# Patient Record
Sex: FEMALE | Race: WHITE | NOT HISPANIC OR LATINO | Employment: OTHER | URBAN - NONMETROPOLITAN AREA
[De-identification: names, ages, dates, MRNs, and addresses within clinical notes are randomized per-mention and may not be internally consistent; named-entity substitution may affect disease eponyms.]

---

## 2017-03-15 ENCOUNTER — OFFICE VISIT (OUTPATIENT)
Dept: CARDIOLOGY | Facility: CLINIC | Age: 47
End: 2017-03-15

## 2017-03-15 VITALS
HEIGHT: 63 IN | BODY MASS INDEX: 20.09 KG/M2 | HEART RATE: 87 BPM | WEIGHT: 113.4 LBS | DIASTOLIC BLOOD PRESSURE: 84 MMHG | SYSTOLIC BLOOD PRESSURE: 135 MMHG

## 2017-03-15 DIAGNOSIS — I10 ESSENTIAL HYPERTENSION: ICD-10-CM

## 2017-03-15 DIAGNOSIS — D68.51 FACTOR 5 LEIDEN MUTATION, HETEROZYGOUS (HCC): ICD-10-CM

## 2017-03-15 DIAGNOSIS — I25.118 CORONARY ARTERY DISEASE OF NATIVE ARTERY OF NATIVE HEART WITH STABLE ANGINA PECTORIS (HCC): ICD-10-CM

## 2017-03-15 DIAGNOSIS — R00.2 PALPITATIONS: ICD-10-CM

## 2017-03-15 DIAGNOSIS — E78.5 HYPERLIPIDEMIA LDL GOAL <70: Primary | ICD-10-CM

## 2017-03-15 PROBLEM — D68.59 HYPERCOAGULABLE STATE: Status: ACTIVE | Noted: 2017-03-15

## 2017-03-15 PROBLEM — I25.10 CORONARY ARTERY DISEASE INVOLVING NATIVE CORONARY ARTERY OF NATIVE HEART WITHOUT ANGINA PECTORIS: Status: ACTIVE | Noted: 2017-03-15

## 2017-03-15 PROBLEM — Z72.0 TOBACCO ABUSE: Status: ACTIVE | Noted: 2017-03-15

## 2017-03-15 PROCEDURE — 99214 OFFICE O/P EST MOD 30 MIN: CPT | Performed by: NURSE PRACTITIONER

## 2017-03-15 RX ORDER — LISINOPRIL 5 MG/1
5 TABLET ORAL DAILY
Qty: 90 TABLET | Refills: 3 | Status: SHIPPED | OUTPATIENT
Start: 2017-03-15 | End: 2018-03-15

## 2017-03-15 RX ORDER — FUROSEMIDE 40 MG/1
40 TABLET ORAL DAILY
COMMUNITY
End: 2020-02-21

## 2017-03-15 RX ORDER — FOLIC ACID 1 MG/1
1 TABLET ORAL DAILY
COMMUNITY
End: 2020-02-21

## 2017-03-15 RX ORDER — NITROGLYCERIN 0.4 MG/1
0.4 TABLET SUBLINGUAL
COMMUNITY
End: 2020-02-21 | Stop reason: SDUPTHER

## 2017-03-15 RX ORDER — CLOPIDOGREL BISULFATE 75 MG/1
75 TABLET ORAL DAILY
Qty: 90 TABLET | Refills: 3 | Status: SHIPPED | OUTPATIENT
Start: 2017-03-15 | End: 2018-03-15

## 2017-03-15 RX ORDER — ASPIRIN 81 MG/1
81 TABLET ORAL DAILY
COMMUNITY
End: 2020-02-21 | Stop reason: SDUPTHER

## 2017-03-15 RX ORDER — PRAVASTATIN SODIUM 20 MG
20 TABLET ORAL DAILY
COMMUNITY
End: 2019-02-15

## 2017-03-15 RX ORDER — CARVEDILOL 3.12 MG/1
3.12 TABLET ORAL 2 TIMES DAILY WITH MEALS
COMMUNITY
End: 2017-05-11

## 2017-03-15 RX ORDER — LISINOPRIL 5 MG/1
5 TABLET ORAL DAILY
COMMUNITY
End: 2017-03-15 | Stop reason: SDUPTHER

## 2017-03-15 RX ORDER — UBIDECARENONE 100 MG
200 CAPSULE ORAL DAILY
COMMUNITY
End: 2020-02-21

## 2017-03-15 RX ORDER — ALPRAZOLAM 1 MG/1
1 TABLET ORAL 3 TIMES DAILY PRN
COMMUNITY

## 2017-03-15 RX ORDER — CLOPIDOGREL BISULFATE 75 MG/1
75 TABLET ORAL DAILY
COMMUNITY
End: 2017-03-15 | Stop reason: SDUPTHER

## 2017-03-15 NOTE — ASSESSMENT & PLAN NOTE
· CCS class II  · Continue aspirin 81 mg daily  · Continue Plavix 75 mg daily  · Continue carvedilol 3.125 mg twice a day

## 2017-03-15 NOTE — PROGRESS NOTES
Encounter Date:03/15/2017    Patient ID: Jania Oconnor is a 46 y.o. female who is , disabled and resides in [place], [place].    CC/Reason for visit:  Coronary Artery Disease (overdue 6 month hfollow up) and Hyperlipidemia          Jania Oconnor presents today for follow-up for coronary artery disease, hyperlipidemia and hypertension.  She was formerly a patient of Dr. Navneet Cox.  Since her last visit she has been under a great deal of stress as she has had 4 deaths in the family 2 of which were suicides.  She has experienced some chest tightness on 2 different occasions over the last 11 months at which time she used sublingual nitroglycerin that resolved her symptoms.  She does not feel her chest pain has increased in frequency/severity or is too physically limiting.  At her last visit she had complaints of palpitations which she is still experiencing and feels they have increased in frequency since her last visit.  She often becomes lightheaded and short of breath when these occur and will have to sit down.  She has also been more fatigued than usual.  The palpitations occur on average 10-15 times per month. She denies orthopnea, or syncope.  She also denies denies any signs or symptoms suggestive of a TIA and/or stroke.  She says she has never wore a monitor for her palpitations.  Her last echocardiogram was in 2010 which showed a normal LVEF and mild MR and TR.  She has known factor V Leiden that is managed by Dr. Perkins.  She is on chronic Coumadin therapy and reports difficulty getting therapeutic as her last INR result was 1.3.  She is also taking aspirin and Plavix and when questioning her regarding this she said Dr. Perkins wants her to stay on all 3 anticoagulants/antiplatelets.    Review of Systems   Constitution: Positive for malaise/fatigue.   Respiratory: Positive for cough and shortness of breath (at rest and on exertion).    Musculoskeletal: Positive for back pain, neck pain and stiffness.  "  Psychiatric/Behavioral: The patient is nervous/anxious.        The patient's past medical, social, and family history reviewed in the patient's electronic medical record.    Allergies  Erythromycin    Outpatient Prescriptions Marked as Taking for the 3/15/17 encounter (Office Visit) with Reinier Mccauley MD   Medication Sig Dispense Refill   • ALPRAZolam (XANAX) 1 MG tablet Take 1 mg by mouth 2 (Two) Times a Day As Needed for Anxiety.     • aspirin 81 MG EC tablet Take 81 mg by mouth Daily.     • carvedilol (COREG) 3.125 MG tablet Take 3.125 mg by mouth 2 (Two) Times a Day With Meals.     • clopidogrel (PLAVIX) 75 MG tablet Take 1 tablet by mouth Daily. 90 tablet 3   • coenzyme Q10 100 MG capsule Take 200 mg by mouth Daily.     • folic acid (FOLVITE) 1 MG tablet Take 1 mg by mouth Daily.     • furosemide (LASIX) 40 MG tablet Take 40 mg by mouth Daily.     • lisinopril (PRINIVIL,ZESTRIL) 5 MG tablet Take 1 tablet by mouth Daily. 90 tablet 3   • nitroglycerin (NITROSTAT) 0.4 MG SL tablet Place 0.4 mg under the tongue Every 5 (Five) Minutes As Needed for Chest Pain. Take no more than 3 doses in 15 minutes.     • pravastatin (PRAVACHOL) 40 MG tablet Take 40 mg by mouth Daily.     • warfarin (COUMADIN) 10 MG tablet Take 10 mg by mouth Daily.     • [DISCONTINUED] clopidogrel (PLAVIX) 75 MG tablet Take 75 mg by mouth Daily.     • [DISCONTINUED] lisinopril (PRINIVIL,ZESTRIL) 5 MG tablet Take 5 mg by mouth Daily.           Blood pressure 135/84, pulse 87, height 63\" (160 cm), weight 113 lb 6.4 oz (51.4 kg).  Body mass index is 20.09 kg/(m^2).    Physical Exam   Constitutional: She is oriented to person, place, and time. She appears well-developed and well-nourished.   HENT:   Head: Normocephalic and atraumatic.   Eyes: Pupils are equal, round, and reactive to light. No scleral icterus.   Neck: No JVD present. Carotid bruit is not present. No thyromegaly present.   Cardiovascular: Normal rate, regular rhythm, S1 " normal and S2 normal.  Exam reveals no gallop.    No murmur heard.  Pulmonary/Chest: Effort normal and breath sounds normal.   Abdominal: Soft. There is no tenderness.   Neurological: She is alert and oriented to person, place, and time.   Skin: Skin is warm and dry. No cyanosis. Nails show no clubbing.   Psychiatric: She has a normal mood and affect. Her behavior is normal.       Data Review:   Procedures         Problem List Items Addressed This Visit        Cardiovascular and Mediastinum    Hyperlipidemia LDL goal <70 - Primary    Overview     · High intensity statin therapy indicated given presence coronary artery disease         Current Assessment & Plan     · Continue pravastatin 40 mg daily  · Follow-up with Dr. Mason for routine lipid monitoring         Relevant Medications    pravastatin (PRAVACHOL) 40 MG tablet    Essential hypertension    Current Assessment & Plan     · Continue lisinopril 5 mg daily         Relevant Medications    furosemide (LASIX) 40 MG tablet    carvedilol (COREG) 3.125 MG tablet    lisinopril (PRINIVIL,ZESTRIL) 5 MG tablet    Palpitations    Current Assessment & Plan     · ZIO Patch  · Echocardiogram at [facility] with follow up on same day in 3 weeks         Relevant Orders    Adult Transthoracic Echo Complete With Contrast    Holter / Event ZIO Patch       Hematopoietic and Hemostatic    Factor 5 Leiden mutation, heterozygous    Overview     · Chronic Coumadin therapy  · Elevated homocystine level.  · Abnormal genetic testing (data deficit).  · Right thigh DVT (data deficit).         Current Assessment & Plan     · Warfarin monitored by Dr. Mason and Dr. Perkins         Relevant Medications    clopidogrel (PLAVIX) 75 MG tablet       Other    Coronary artery disease of native artery of native heart with stable angina pectoris    Overview     · Cardiac catheterization for anterior MI (04/03/2012): Bare metal stent to LAD.  LVEF 50-55%.  · Cardiolite stress test (10/12/2012):  Negative for ischemia/scar.  LVEF 60%.  · Pamella Scan stress test (09/09/2013) C: Negative for ischemia scar.  Normal LVEF.  · Pamella scan stress test (11/03/2014): Negative for ischemia or scar.  LVEF 66%  · Echocardiogram (11/03/2014): Mild MR and TR.  RVSP 26 mg mercury.  LVEF 55-60%.           Current Assessment & Plan     · CCS class II  · Continue aspirin 81 mg daily  · Continue Plavix 75 mg daily  · Continue carvedilol 3.125 mg twice a day                    · Two-week event monitor  · Echocardiogram at [facility] in 4 weeks at which time we will also schedule follow-up appointment to review results of her event monitor    Estelle TYLER evaluated and treated this patient independently    NAYELI Castro  3/15/2017

## 2017-03-15 NOTE — ASSESSMENT & PLAN NOTE
· ZIO Patch  · Echocardiogram at [facility] with follow up on same day in 3 weeks

## 2017-04-21 ENCOUNTER — OFFICE VISIT (OUTPATIENT)
Dept: CARDIOLOGY | Facility: CLINIC | Age: 47
End: 2017-04-21

## 2017-04-21 ENCOUNTER — HOSPITAL ENCOUNTER (OUTPATIENT)
Dept: CARDIOLOGY | Facility: HOSPITAL | Age: 47
Discharge: HOME | End: 2017-04-21
Admitting: NURSE PRACTITIONER

## 2017-04-21 VITALS
HEIGHT: 63 IN | SYSTOLIC BLOOD PRESSURE: 102 MMHG | DIASTOLIC BLOOD PRESSURE: 76 MMHG | HEART RATE: 86 BPM | WEIGHT: 113.4 LBS | BODY MASS INDEX: 20.09 KG/M2

## 2017-04-21 DIAGNOSIS — E78.5 HYPERLIPIDEMIA LDL GOAL <70: ICD-10-CM

## 2017-04-21 DIAGNOSIS — R00.2 PALPITATIONS: ICD-10-CM

## 2017-04-21 DIAGNOSIS — Z72.0 TOBACCO ABUSE: ICD-10-CM

## 2017-04-21 DIAGNOSIS — I25.118 CORONARY ARTERY DISEASE OF NATIVE ARTERY OF NATIVE HEART WITH STABLE ANGINA PECTORIS (HCC): Primary | ICD-10-CM

## 2017-04-21 PROBLEM — I10 ESSENTIAL HYPERTENSION: Status: RESOLVED | Noted: 2017-03-15 | Resolved: 2017-04-21

## 2017-04-21 LAB
BH CV ECHO MEAS - BSA(HAYCOCK): 1.5 M^2
BH CV ECHO MEAS - BSA: 1.5 M^2
BH CV ECHO MEAS - BZI_BMI: 20 KILOGRAMS/M^2
BH CV ECHO MEAS - BZI_METRIC_HEIGHT: 160 CM
BH CV ECHO MEAS - BZI_METRIC_WEIGHT: 51.3 KG
BH CV ECHO MEAS - CONTRAST EF (2CH): 63.8 ML/M^2
BH CV ECHO MEAS - CONTRAST EF 4CH: 66.7 ML/M^2
BH CV ECHO MEAS - EDV(CUBED): 51.1 ML
BH CV ECHO MEAS - EDV(MOD-SP2): 58 ML
BH CV ECHO MEAS - EDV(MOD-SP4): 72 ML
BH CV ECHO MEAS - EDV(TEICH): 58.5 ML
BH CV ECHO MEAS - EF(CUBED): 69 %
BH CV ECHO MEAS - EF(MOD-SP2): 63.8 %
BH CV ECHO MEAS - EF(MOD-SP4): 66.7 %
BH CV ECHO MEAS - EF(TEICH): 61.5 %
BH CV ECHO MEAS - ESV(CUBED): 15.8 ML
BH CV ECHO MEAS - ESV(MOD-SP2): 21 ML
BH CV ECHO MEAS - ESV(MOD-SP4): 24 ML
BH CV ECHO MEAS - ESV(TEICH): 22.5 ML
BH CV ECHO MEAS - FS: 32.3 %
BH CV ECHO MEAS - IVS/LVPW: 1.3
BH CV ECHO MEAS - IVSD: 1.1 CM
BH CV ECHO MEAS - LA DIMENSION: 3 CM
BH CV ECHO MEAS - LV DIASTOLIC VOL/BSA (35-75): 47.5 ML/M^2
BH CV ECHO MEAS - LV MASS(C)D: 112.4 GRAMS
BH CV ECHO MEAS - LV MASS(C)DI: 74.1 GRAMS/M^2
BH CV ECHO MEAS - LV MAX PG: 5.1 MMHG
BH CV ECHO MEAS - LV MEAN PG: 2 MMHG
BH CV ECHO MEAS - LV SYSTOLIC VOL/BSA (12-30): 15.8 ML/M^2
BH CV ECHO MEAS - LV V1 MAX: 113 CM/SEC
BH CV ECHO MEAS - LV V1 MEAN: 63.3 CM/SEC
BH CV ECHO MEAS - LV V1 VTI: 22.4 CM
BH CV ECHO MEAS - LVIDD: 3.7 CM
BH CV ECHO MEAS - LVIDS: 2.5 CM
BH CV ECHO MEAS - LVLD AP2: 7 CM
BH CV ECHO MEAS - LVLD AP4: 7.3 CM
BH CV ECHO MEAS - LVLS AP2: 5.7 CM
BH CV ECHO MEAS - LVLS AP4: 6 CM
BH CV ECHO MEAS - LVPWD: 0.86 CM
BH CV ECHO MEAS - PA ACC SLOPE: 379 CM/SEC^2
BH CV ECHO MEAS - PA ACC TIME: 0.19 SEC
BH CV ECHO MEAS - PA PR(ACCEL): -7.9 MMHG
BH CV ECHO MEAS - RAP SYSTOLE: 8 MMHG
BH CV ECHO MEAS - RVDD: 2.8 CM
BH CV ECHO MEAS - RVSP: 27 MMHG
BH CV ECHO MEAS - SI(CUBED): 23.2 ML/M^2
BH CV ECHO MEAS - SI(MOD-SP2): 24.4 ML/M^2
BH CV ECHO MEAS - SI(MOD-SP4): 31.6 ML/M^2
BH CV ECHO MEAS - SI(TEICH): 23.7 ML/M^2
BH CV ECHO MEAS - SV(CUBED): 35.3 ML
BH CV ECHO MEAS - SV(MOD-SP2): 37 ML
BH CV ECHO MEAS - SV(MOD-SP4): 48 ML
BH CV ECHO MEAS - SV(TEICH): 36 ML
BH CV ECHO MEAS - TAPSE (>1.6): 2.7 CM2
BH CV ECHO MEAS - TR MAX VEL: 218 CM/SEC
LEFT ATRIUM VOLUME INDEX: 18 ML/M2
LV EF 2D ECHO EST: 70 %

## 2017-04-21 PROCEDURE — 93306 TTE W/DOPPLER COMPLETE: CPT

## 2017-04-21 PROCEDURE — 93306 TTE W/DOPPLER COMPLETE: CPT | Performed by: INTERNAL MEDICINE

## 2017-04-21 PROCEDURE — 99214 OFFICE O/P EST MOD 30 MIN: CPT | Performed by: INTERNAL MEDICINE

## 2017-04-21 NOTE — ASSESSMENT & PLAN NOTE
· Minimal anginal symptoms at present.  · Discontinue carvedilol due to fatigue symptoms  · Anginal symptoms recur, may consider low-dose metoprolol or nebivolol.

## 2017-04-21 NOTE — PROGRESS NOTES
Encounter Date:04/21/2017    Patient ID: Jania Oconnor is a  46 y.o. female who resides in [place], [place].    CC/Reason for visit:  Coronary Artery Disease (Follow up..  Results from heart monitor. ) and Shortness of Breath          Jania Oconnor returns today as a follow up for coronary artery disease, hypertension, hyperlipidemia, and palpitations.  The patient underwent an echocardiogram and Holter monitor prior to her visit today.  Her echocardiogram was normal.  Her Holter monitor showed short episodes of atrial tachycardia.  The patient states that she has been feeling okay although she thinks that her carvedilol causes her fatigue.    Review of Systems   Constitution: Negative for weakness and malaise/fatigue.   Eyes: Negative for vision loss in left eye and vision loss in right eye.   Cardiovascular: Negative for chest pain, dyspnea on exertion, near-syncope, orthopnea, palpitations, paroxysmal nocturnal dyspnea and syncope.   Hematologic/Lymphatic: Bruises/bleeds easily.   Musculoskeletal: Positive for arthritis, back pain, joint swelling, muscle cramps, neck pain and stiffness. Negative for myalgias.   Genitourinary: Positive for dysuria.   Neurological: Negative for brief paralysis, excessive daytime sleepiness, focal weakness, numbness and paresthesias.   Psychiatric/Behavioral: The patient is nervous/anxious.    Allergic/Immunologic: Positive for environmental allergies.   All other systems reviewed and are negative.      The patient's past medical, social, and family history reviewed in the patient's electronic medical record.    Allergies  Erythromycin    Outpatient Prescriptions Marked as Taking for the 4/21/17 encounter (Office Visit) with Reinier Mccauley MD   Medication Sig Dispense Refill   • ALPRAZolam (XANAX) 1 MG tablet Take 1 mg by mouth 2 (Two) Times a Day As Needed for Anxiety.     • aspirin 81 MG EC tablet Take 81 mg by mouth Daily.     • carvedilol (COREG) 3.125 MG tablet  "Take 3.125 mg by mouth 2 (Two) Times a Day With Meals.     • clopidogrel (PLAVIX) 75 MG tablet Take 1 tablet by mouth Daily. 90 tablet 3   • coenzyme Q10 100 MG capsule Take 200 mg by mouth Daily.     • folic acid (FOLVITE) 1 MG tablet Take 1 mg by mouth Daily.     • furosemide (LASIX) 40 MG tablet Take 40 mg by mouth Daily.     • lisinopril (PRINIVIL,ZESTRIL) 5 MG tablet Take 1 tablet by mouth Daily. 90 tablet 3   • nitroglycerin (NITROSTAT) 0.4 MG SL tablet Place 0.4 mg under the tongue Every 5 (Five) Minutes As Needed for Chest Pain. Take no more than 3 doses in 15 minutes.     • Phenazopyridine HCl (AZO TABS PO) Take 2 tablets by mouth 3 (Three) Times a Day.     • pravastatin (PRAVACHOL) 40 MG tablet Take 40 mg by mouth Daily.     • warfarin (COUMADIN) 10 MG tablet Take 6 mg by mouth Daily. Alternating between 6 mg & 7 mg daily.           Blood pressure 102/76, pulse 86, height 63\" (160 cm), weight 113 lb 6.4 oz (51.4 kg).  Body mass index is 20.09 kg/(m^2).    Physical Exam   Constitutional: She is oriented to person, place, and time. She appears well-developed and well-nourished.   HENT:   Head: Normocephalic and atraumatic.   Eyes: Pupils are equal, round, and reactive to light. No scleral icterus.   Neck: No JVD present. Carotid bruit is not present. No thyromegaly present.   Cardiovascular: Normal rate, regular rhythm, S1 normal and S2 normal.  Exam reveals no gallop.    No murmur heard.  Pulmonary/Chest: Effort normal and breath sounds normal.   Abdominal: Soft. There is no tenderness.   Neurological: She is alert and oriented to person, place, and time.   Skin: Skin is warm and dry. No cyanosis. Nails show no clubbing.   Psychiatric: She has a normal mood and affect. Her behavior is normal.       Data Review:     Lab Results   Component Value Date    WBC 8.32 12/22/2016    HGB 14.4 12/22/2016    HCT 42.0 12/22/2016    MCV 89.6 12/22/2016     12/22/2016     Lab Results   Component Value Date    INR " 1.83 (H) 12/22/2016    INR 2.23 (H) 12/01/2016    INR 2.42 04/05/2015    PROTIME 20.8 (H) 12/22/2016    PROTIME 25.4 (H) 12/01/2016    PROTIME 27.4 (H) 04/05/2015       Procedures    Echocardiogram images from today reviewed.  Normal LV systolic function.  No significant valvular abnormality.    14 day Holter monitor results reviewed.  The patient had sinus rhythm predominantly.  There were short runs of atrial tachycardia for which the patient recorded symptoms.  No ventricular arrhythmia or significant ectopy.       Problem List Items Addressed This Visit        Cardiovascular and Mediastinum    Hyperlipidemia LDL goal <70    Overview     · High intensity statin therapy indicated given presence coronary artery disease         Current Assessment & Plan     · Check CMP and lipids today  · If LDL >70, consider switching to atorvastatin 40 mg daily         Relevant Orders    Comprehensive Metabolic Panel    Lipid Panel    Palpitations    Overview     · Zio patch (3/15/2017): Sinus rhythm.  Short episodes of atrial tachycardia.  No significant arrhythmia.            Other    Coronary artery disease of native artery of native heart with stable angina pectoris - Primary    Overview     · Cardiac catheterization for anterior MI/out-of-hospital cardiac arrest (04/03/2012): Bare metal stent to LAD.  LVEF 50-55%.  · Pharmacologic nuclear stress test (11/03/2014): Negative for ischemia or scar.  LVEF 66%  · Echo (4/21/2017): Normal LVEF.  Significant valvular abnormality         Current Assessment & Plan     · Minimal anginal symptoms at present.  · Discontinue carvedilol due to fatigue symptoms  · Anginal symptoms recur, may consider low-dose metoprolol or nebivolol.         RESOLVED: Tobacco abuse    Overview     · Smoking cessation (04/03/2012).                    · Discontinue Coreg 3.125 mg due to fatigue and low energy.  · CMP and lipids  · If LDL >70, switched to atorvastatin 20 mg daily at bedtime    Reinier SANDHU  MD [place]  4/21/2017     Scribed for Reinier Mccauley MD by Tiago Kumar. 4/21/2017  6:58 PM    I, Reinier Mccauley MD, personally performed the services described in this documentation as scribed by the above named individual in my presence, and it is both accurate and complete.  4/21/2017  6:58 PM

## 2017-05-01 ENCOUNTER — OFFICE VISIT (OUTPATIENT)
Dept: CARDIOLOGY | Facility: CLINIC | Age: 47
End: 2017-05-01

## 2017-05-01 DIAGNOSIS — R00.2 PALPITATIONS: ICD-10-CM

## 2017-05-01 PROCEDURE — 0298T PR EXT ECG > 48HR TO 21 DAY REVIEW AND INTERPRETATN: CPT | Performed by: INTERNAL MEDICINE

## 2017-05-11 ENCOUNTER — LAB (OUTPATIENT)
Dept: ONCOLOGY | Facility: CLINIC | Age: 47
End: 2017-05-11

## 2017-05-11 ENCOUNTER — OFFICE VISIT (OUTPATIENT)
Dept: ONCOLOGY | Facility: CLINIC | Age: 47
End: 2017-05-11

## 2017-05-11 VITALS
OXYGEN SATURATION: 96 % | HEART RATE: 80 BPM | SYSTOLIC BLOOD PRESSURE: 106 MMHG | RESPIRATION RATE: 18 BRPM | HEIGHT: 63 IN | TEMPERATURE: 98.4 F | WEIGHT: 114.9 LBS | BODY MASS INDEX: 20.36 KG/M2 | DIASTOLIC BLOOD PRESSURE: 70 MMHG

## 2017-05-11 DIAGNOSIS — I25.10 ATHEROSCLEROSIS OF NATIVE CORONARY ARTERY WITHOUT ANGINA PECTORIS, UNSPECIFIED WHETHER NATIVE OR TRANSPLANTED HEART: ICD-10-CM

## 2017-05-11 DIAGNOSIS — E78.5 HYPERLIPIDEMIA, UNSPECIFIED HYPERLIPIDEMIA TYPE: Primary | ICD-10-CM

## 2017-05-11 DIAGNOSIS — E72.11 HYPERHOMOCYSTEINEMIA (HCC): ICD-10-CM

## 2017-05-11 DIAGNOSIS — R00.2 PALPITATIONS: ICD-10-CM

## 2017-05-11 DIAGNOSIS — D68.51 FACTOR 5 LEIDEN MUTATION, HETEROZYGOUS (HCC): Primary | ICD-10-CM

## 2017-05-11 LAB
ALBUMIN SERPL-MCNC: 4.2 G/DL (ref 3.5–5)
ALBUMIN/GLOB SERPL: 1.6 G/DL (ref 1.5–2.5)
ALP SERPL-CCNC: 86 U/L (ref 35–104)
ALT SERPL W P-5'-P-CCNC: 17 U/L (ref 10–36)
ANION GAP SERPL CALCULATED.3IONS-SCNC: 4.3 MMOL/L (ref 3.6–11.2)
AST SERPL-CCNC: 15 U/L (ref 10–30)
BASOPHILS # BLD AUTO: 0.03 10*3/MM3 (ref 0–0.3)
BASOPHILS NFR BLD AUTO: 0.6 % (ref 0–2)
BILIRUB SERPL-MCNC: 0.7 MG/DL (ref 0.2–1.8)
BUN BLD-MCNC: 12 MG/DL (ref 7–21)
BUN/CREAT SERPL: 16 (ref 7–25)
CALCIUM SPEC-SCNC: 9.5 MG/DL (ref 7.7–10)
CHLORIDE SERPL-SCNC: 109 MMOL/L (ref 99–112)
CHOLEST SERPL-MCNC: 149 MG/DL (ref 0–200)
CO2 SERPL-SCNC: 28.7 MMOL/L (ref 24.3–31.9)
CREAT BLD-MCNC: 0.75 MG/DL (ref 0.43–1.29)
DEPRECATED RDW RBC AUTO: 43.1 FL (ref 37–54)
EOSINOPHIL # BLD AUTO: 0.14 10*3/MM3 (ref 0–0.7)
EOSINOPHIL NFR BLD AUTO: 2.8 % (ref 0–5)
ERYTHROCYTE [DISTWIDTH] IN BLOOD BY AUTOMATED COUNT: 13.3 % (ref 11.5–14.5)
GFR SERPL CREATININE-BSD FRML MDRD: 83 ML/MIN/1.73
GLOBULIN UR ELPH-MCNC: 2.6 GM/DL
GLUCOSE BLD-MCNC: 124 MG/DL (ref 70–110)
HCT VFR BLD AUTO: 38.4 % (ref 37–47)
HCYS SERPL-MCNC: 24.5 UMOL/L (ref 5–13.9)
HDLC SERPL-MCNC: 37 MG/DL (ref 60–100)
HGB BLD-MCNC: 13 G/DL (ref 12–16)
IMM GRANULOCYTES # BLD: 0 10*3/MM3 (ref 0–0.03)
IMM GRANULOCYTES NFR BLD: 0 % (ref 0–0.5)
LDLC SERPL CALC-MCNC: 91 MG/DL (ref 0–100)
LDLC/HDLC SERPL: 2.46 {RATIO}
LYMPHOCYTES # BLD AUTO: 1.8 10*3/MM3 (ref 1–3)
LYMPHOCYTES NFR BLD AUTO: 36.3 % (ref 21–51)
MCH RBC QN AUTO: 30.7 PG (ref 27–33)
MCHC RBC AUTO-ENTMCNC: 33.9 G/DL (ref 33–37)
MCV RBC AUTO: 90.6 FL (ref 80–94)
MONOCYTES # BLD AUTO: 0.44 10*3/MM3 (ref 0.1–0.9)
MONOCYTES NFR BLD AUTO: 8.9 % (ref 0–10)
NEUTROPHILS # BLD AUTO: 2.55 10*3/MM3 (ref 1.4–6.5)
NEUTROPHILS NFR BLD AUTO: 51.4 % (ref 30–70)
OSMOLALITY SERPL CALC.SUM OF ELEC: 284.3 MOSM/KG (ref 273–305)
PLATELET # BLD AUTO: 210 10*3/MM3 (ref 130–400)
PMV BLD AUTO: 11.4 FL (ref 6–10)
POTASSIUM BLD-SCNC: 3.4 MMOL/L (ref 3.5–5.3)
PROT SERPL-MCNC: 6.8 G/DL (ref 6–8)
RBC # BLD AUTO: 4.24 10*6/MM3 (ref 4.2–5.4)
SODIUM BLD-SCNC: 142 MMOL/L (ref 135–153)
TRIGL SERPL-MCNC: 104 MG/DL (ref 0–150)
VLDLC SERPL-MCNC: 20.8 MG/DL
WBC NRBC COR # BLD: 4.96 10*3/MM3 (ref 4.5–12.5)

## 2017-05-11 PROCEDURE — 99214 OFFICE O/P EST MOD 30 MIN: CPT | Performed by: INTERNAL MEDICINE

## 2017-05-11 PROCEDURE — 85025 COMPLETE CBC W/AUTO DIFF WBC: CPT | Performed by: INTERNAL MEDICINE

## 2017-05-11 PROCEDURE — 80061 LIPID PANEL: CPT | Performed by: INTERNAL MEDICINE

## 2017-05-11 PROCEDURE — 36415 COLL VENOUS BLD VENIPUNCTURE: CPT

## 2017-05-11 PROCEDURE — 36415 COLL VENOUS BLD VENIPUNCTURE: CPT | Performed by: INTERNAL MEDICINE

## 2017-05-11 PROCEDURE — 83090 ASSAY OF HOMOCYSTEINE: CPT | Performed by: INTERNAL MEDICINE

## 2017-05-11 PROCEDURE — 80053 COMPREHEN METABOLIC PANEL: CPT | Performed by: INTERNAL MEDICINE

## 2018-05-08 DIAGNOSIS — D68.51 FACTOR V LEIDEN MUTATION (HCC): Primary | ICD-10-CM

## 2019-02-14 NOTE — PROGRESS NOTES
[facility]  Outpatient Follow-up Visit    Jania Oconnor  1970  PCP: Rissa Mason MD      ID:  Jania Oconnor is a 48 y.o. female from [place], [place].    No chief complaint on file.           The patient returns today for her overdue follow-up of palpitations, coronary artery disease and hyperlipidemia. At her last visit, her carvedilol was discontinued due to fatigue. She worse a ZIO monitor in 2017, which showed sinus rhythm with short episodes of atrial tachycardia.              Allergies   Allergen Reactions   • Erythromycin GI Intolerance       Current Outpatient Medications:   •  ALPRAZolam (XANAX) 1 MG tablet, Take 1 mg by mouth 2 (Two) Times a Day As Needed for Anxiety., Disp: , Rfl:   •  aspirin 81 MG EC tablet, Take 81 mg by mouth Daily., Disp: , Rfl:   •  coenzyme Q10 100 MG capsule, Take 200 mg by mouth Daily., Disp: , Rfl:   •  folic acid (FOLVITE) 1 MG tablet, Take 1 mg by mouth Daily., Disp: , Rfl:   •  furosemide (LASIX) 40 MG tablet, Take 40 mg by mouth Daily., Disp: , Rfl:   •  nitroglycerin (NITROSTAT) 0.4 MG SL tablet, Place 0.4 mg under the tongue Every 5 (Five) Minutes As Needed for Chest Pain. Take no more than 3 doses in 15 minutes., Disp: , Rfl:   •  pravastatin (PRAVACHOL) 40 MG tablet, Take 40 mg by mouth Daily., Disp: , Rfl:   •  warfarin (COUMADIN) 10 MG tablet, Take 6 mg by mouth Daily. Alternating between 6 mg & 7 mg daily., Disp: , Rfl:     Past Medical History, Past Surgical History, Family history, Social History, and Medications were all reviewed with the patient today and updated as necessary.     Past Medical History:   Diagnosis Date   • Anemia    • Anxiety    • Arthritis    • CHF (congestive heart failure) (CMS/HCC)    • Hyperlipidemia    • Myocardial infarction    • Panic disorder      Past Surgical History:   Procedure Laterality Date   • CARDIAC CATHETERIZATION  04/03/2012   • DILATATION AND CURETTAGE     • HYSTERECTOMY     • SINUS  SURGERY     • VASCULAR SURGERY       Family History   Problem Relation Age of Onset   • Lung cancer Mother    • Heart attack Father 45        several MI's   • Heart failure Father    • Heart attack Sister 42   • Heart attack Brother 36   • Heart attack Paternal Uncle 40   • Heart attack Paternal Uncle 45   • Heart attack Paternal Uncle 45   • Heart attack Paternal Uncle 40   • Heart attack Paternal Uncle 45   • Heart attack Paternal Uncle 45     Social History     Tobacco Use   • Smoking status: Former Smoker     Packs/day: 0.50     Years: 15.00     Pack years: 7.50     Types: Cigarettes     Last attempt to quit: 4/3/2012     Years since quittin.8   • Smokeless tobacco: Never Used   Substance Use Topics   • Alcohol use: No       ROS            There were no vitals taken for this visit.       Wt Readings from Last 5 Encounters:   17 52.1 kg (114 lb 14.4 oz)   17 51.4 kg (113 lb 6.4 oz)   03/15/17 51.4 kg (113 lb 6.4 oz)   16 52.2 kg (115 lb)   16 52.2 kg (115 lb)       BP Readings from Last 5 Encounters:   17 106/70   17 102/76   03/15/17 135/84   16 118/82   16 130/88       Physical Exam    EKG: (EKG has been independently visualized by me and summarized below)    Procedures     Labs (2018):  · Glucose 99, Creat 0.76, BUN 8, Na 142, K 4.2, AST 13, ALT 11  · Chol 199, Trig 107, HDL 45,   No results found for this or any previous visit (from the past 672 hour(s)).         Problem List Items Addressed This Visit     None               · ***  · No Follow-up on file.          KAT Mccauley M.D., formerly [facility], [facility]  Interventional Cardiology  2019  1:03 PM

## 2019-02-14 NOTE — PROGRESS NOTES
Encounter Date:02/15/2019    Patient ID: Jania Oconnor is a 48 y.o. female who resides in [place], [place]    CC/Reason for visit:  Coronary Artery Disease; Palpitations; Hyperlipidemia; Chest Pain; and Shortness of Breath           Problem List Items Addressed This Visit        Cardiovascular and Mediastinum    Coronary artery disease of native artery of native heart with stable angina pectoris (CMS/HCC) - Primary    Overview     · Cardiac catheterization for anterior MI/out-of-hospital cardiac arrest (04/03/2012): Bare metal stent to LAD.  LVEF 50-55%.  · Pharmacologic nuclear stress test (11/03/2014): Negative for ischemia or scar.  LVEF 66%  · Echo (4/21/2017): Normal LVEF.  Significant valvular abnormality         Current Assessment & Plan     · Obtain myocardial perfusion study [facility]  · Continue aspirin 81 mg daily  · Continue Plavix 75 mg daily  · Continue nitroglycerin for any episodes of angina         Relevant Medications    clopidogrel (PLAVIX) 75 MG tablet    Other Relevant Orders    Stress Test With Myocardial Perfusion (1 Day)    Bradycardia    Overview     · Sinus bradycardia noted on EKG 2/15/2019         Current Assessment & Plan     · Has baseline bradycardia.  Avoid all AV bruno blocking agents         Hyperlipidemia LDL goal <70    Overview     · High intensity statin therapy indicated given presence coronary artery disease         Current Assessment & Plan     · Discontinue pravastatin and start Lipitor 40 mg daily  · CMP and lipid profile in 6 weeks.         Relevant Medications    atorvastatin (LIPITOR) 40 MG tablet    Other Relevant Orders    Comprehensive Metabolic Panel    Lipid Panel    Palpitations    Overview     · Zio patch (3/15/2017): Sinus rhythm.  Short episodes of atrial tachycardia.  No significant arrhythmia.         Current Assessment & Plan     · Patient asymptomatic.  Palpitations have not changed            Hematopoietic and Hemostatic    Factor 5 Leiden mutation,  heterozygous (CMS/HCC)    Overview     · Chronic Coumadin therapy  · Elevated homocystine level.  · Abnormal genetic testing (data deficit).  · Right thigh DVT (data deficit).         Current Assessment & Plan     · Continue Coumadin.  Dosage adjustment per INR results and managed by PCP  · Follow up with hematology         Relevant Medications    clopidogrel (PLAVIX) 75 MG tablet          Patient is been experiencing intermittent chest pain that is concerning for unstable angina.  We will schedule her a myocardial perfusion study at [facility].  I will discontinue her pravastatin and start her on Lipitor 40 mg daily.  She will need CMP and lipid profile in 6 weeks.  She will follow-up in 12 months or sooner if the results for stress test are abnormal.     · Schedule myocardial perfusion study at [facility].  If abnormal will require cardiac catheterization  · Discontinue pravastatin and start Lipitor 40 mg daily.  · CMP and lipid profile in 6 weeks  · Follow-up 12 months or sooner pending results of stress test        Jania Oconnor returns today for follow-up of her coronary artery disease and hyperlipidemia.  At her last visit she reported increased fatigue and her Coreg was discontinued which did help her symptoms.  She has factor V Leiden on chronic Coumadin therapy and is followed by Dr. Perkins with hematology.  Her INRs are managed by her PCP Dr. Rissa Trivedi.  Lately they have been subtherapeutic with most recent result of 1.7.  For the past 2-3  months she has been experiencing intermittent chest heaviness associated with increased shortness of breath.  It is not related to exertional activities and can occur at rest.  It often radiates into her left shoulder and down her arm.  This past November she had such a severe episode she used her nitroglycerin.  When her symptoms persisted she took a second nitroglycerin.  When they still persisted she went to her local EMS service which is down the road  from her as she is friends with several of the employees.  They did an EKG that showed normal sinus rhythm but a possible inferior infarct read by the machine.  There was some nonspecific ST changes.  She did not noted the emergency room to seek medical treatment because her symptoms ended up improving and eventually resolved.  She has had several more episodes since then but not quite as severe as that one.  EKG today shows sinus bradycardia with a heart rate of 55 but otherwise normal without ST abnormality.  She is taking pravastatin daily and tolerating without any reports myalgias.  Recent blood work obtained in the fall showed an elevated LDL of 133.  Does experience occasional palpitations but is asymptomatic with them and they have not changed over the years.  She has a significant family history of coronary artery disease at a young age.  Her brother has actually had 3 heart attacks since her last visit with us.  As known coronary artery disease and experiencing an out of hospital arrest/MI and received a bare metal stent in her LAD.  She remains on dual antiplatelet therapy despite this being almost 8 years ago as she reports hematology once her to continue on those given her known hypercoagulable state.    Review of Systems   Constitution: Negative for weakness and malaise/fatigue.   Eyes: Negative for vision loss in left eye and vision loss in right eye.   Cardiovascular: Negative for chest pain, dyspnea on exertion, near-syncope, orthopnea, palpitations, paroxysmal nocturnal dyspnea and syncope.   Musculoskeletal: Negative for myalgias.   Neurological: Negative for brief paralysis, excessive daytime sleepiness, focal weakness, numbness and paresthesias.   All other systems reviewed and are negative.      The patient's past medical, social, family history and ROS reviewed in the patient's electronic medical record.    Allergies  Erythromycin    Outpatient Medications Marked as Taking for the 2/15/19  "encounter (Office Visit) with Estelle Kent APRN   Medication Sig Dispense Refill   • ALPRAZolam (XANAX) 1 MG tablet Take 1 mg by mouth 2 (Two) Times a Day As Needed for Anxiety.     • aspirin 81 MG EC tablet Take 81 mg by mouth Daily.     • clopidogrel (PLAVIX) 75 MG tablet Take 75 mg by mouth Daily.     • coenzyme Q10 100 MG capsule Take 200 mg by mouth Daily.     • Cyanocobalamin ([employer] B-12) 1000 MCG/15ML liquid Take  by mouth Daily.     • folic acid (FOLVITE) 1 MG tablet Take 1 mg by mouth Daily.     • furosemide (LASIX) 40 MG tablet Take 40 mg by mouth Daily.     • nitroglycerin (NITROSTAT) 0.4 MG SL tablet Place 0.4 mg under the tongue Every 5 (Five) Minutes As Needed for Chest Pain. Take no more than 3 doses in 15 minutes.     • warfarin (COUMADIN) 10 MG tablet Take  by mouth Daily. Alternating between 8-10mg     • [DISCONTINUED] pravastatin (PRAVACHOL) 20 MG tablet Take 20 mg by mouth Daily.             Blood pressure 124/84, pulse 86, height 160 cm (63\"), weight 48.5 kg (107 lb), SpO2 98 %.  Body mass index is 18.95 kg/m².  There were no vitals filed for this visit.    Physical Exam   Constitutional: She is oriented to person, place, and time. She appears well-developed and well-nourished.   HENT:   Head: Normocephalic and atraumatic.   Eyes: Pupils are equal, round, and reactive to light. No scleral icterus.   Neck: No JVD present. Carotid bruit is not present. No thyromegaly present.   Cardiovascular: Normal rate and regular rhythm. Exam reveals no gallop.   No murmur heard.  Pulmonary/Chest: Effort normal and breath sounds normal.   Abdominal: Soft. She exhibits no distension. There is no hepatosplenomegaly.   Musculoskeletal: She exhibits no edema.   Neurological: She is alert and oriented to person, place, and time.   Skin: Skin is warm and dry.   Psychiatric: She has a normal mood and affect. Her behavior is normal.       Data Review:       ECG 12 Lead  Date/Time: 2/15/2019 3:28 PM  Performed by: " Estelle Kent APRN  Authorized by: Estelle Kent APRN   Comparison: compared with previous ECG from 3/13/2016  Comparison to previous ECG: Sinus bradycardia has replaced sinus rhythm  Rhythm: sinus bradycardia  BPM: 55    Clinical impression: normal ECG  Comments: Sinus bradycardia otherwise normal  ID interval 132 MS  QRS duration 84 MS  QT/QTc 404/386 MS            Lab Results   Component Value Date    CHOL 149 05/11/2017    TRIG 104 05/11/2017    HDL 37 (L) 05/11/2017    LDL 91 05/11/2017    AST 15 05/11/2017    ALT 17 05/11/2017       Laboratory data 09/07/2018:  BUNs 8, creatinine and 6, sodium 142, potassium 4.2, total cholesterol 199, triglycerides 107, HDL 45, , AST 13, ALT 11      NAYELI Castro  2/15/2019

## 2019-02-15 ENCOUNTER — OFFICE VISIT (OUTPATIENT)
Dept: CARDIOLOGY | Facility: CLINIC | Age: 49
End: 2019-02-15

## 2019-02-15 VITALS
OXYGEN SATURATION: 98 % | WEIGHT: 107 LBS | HEART RATE: 86 BPM | SYSTOLIC BLOOD PRESSURE: 124 MMHG | BODY MASS INDEX: 18.96 KG/M2 | DIASTOLIC BLOOD PRESSURE: 84 MMHG | HEIGHT: 63 IN

## 2019-02-15 DIAGNOSIS — D68.51 FACTOR 5 LEIDEN MUTATION, HETEROZYGOUS (HCC): ICD-10-CM

## 2019-02-15 DIAGNOSIS — E78.5 HYPERLIPIDEMIA LDL GOAL <70: ICD-10-CM

## 2019-02-15 DIAGNOSIS — R00.2 PALPITATIONS: ICD-10-CM

## 2019-02-15 DIAGNOSIS — I25.118 CORONARY ARTERY DISEASE OF NATIVE ARTERY OF NATIVE HEART WITH STABLE ANGINA PECTORIS (HCC): Primary | ICD-10-CM

## 2019-02-15 DIAGNOSIS — R00.1 BRADYCARDIA: ICD-10-CM

## 2019-02-15 PROCEDURE — 93000 ELECTROCARDIOGRAM COMPLETE: CPT | Performed by: NURSE PRACTITIONER

## 2019-02-15 PROCEDURE — 99214 OFFICE O/P EST MOD 30 MIN: CPT | Performed by: NURSE PRACTITIONER

## 2019-02-15 RX ORDER — ATORVASTATIN CALCIUM 40 MG/1
40 TABLET, FILM COATED ORAL DAILY
Qty: 60 TABLET | Refills: 0 | Status: SHIPPED | OUTPATIENT
Start: 2019-02-15 | End: 2020-02-21

## 2019-02-15 RX ORDER — CLOPIDOGREL BISULFATE 75 MG/1
75 TABLET ORAL DAILY
COMMUNITY
End: 2020-10-14 | Stop reason: SDUPTHER

## 2019-02-15 NOTE — ASSESSMENT & PLAN NOTE
· Continue Coumadin.  Dosage adjustment per INR results and managed by PCP  · Follow up with hematology

## 2019-02-15 NOTE — ASSESSMENT & PLAN NOTE
· Obtain myocardial perfusion study [facility]  · Continue aspirin 81 mg daily  · Continue Plavix 75 mg daily  · Continue nitroglycerin for any episodes of angina

## 2019-03-01 ENCOUNTER — APPOINTMENT (OUTPATIENT)
Dept: CARDIOLOGY | Facility: HOSPITAL | Age: 49
End: 2019-03-01

## 2019-03-13 ENCOUNTER — APPOINTMENT (OUTPATIENT)
Dept: CARDIOLOGY | Facility: HOSPITAL | Age: 49
End: 2019-03-13

## 2019-03-18 ENCOUNTER — HOSPITAL ENCOUNTER (OUTPATIENT)
Dept: CARDIOLOGY | Facility: HOSPITAL | Age: 49
Discharge: HOME | End: 2019-03-18

## 2019-03-18 VITALS — WEIGHT: 107 LBS | HEIGHT: 63 IN | BODY MASS INDEX: 18.96 KG/M2

## 2019-03-18 DIAGNOSIS — I25.118 CORONARY ARTERY DISEASE OF NATIVE ARTERY OF NATIVE HEART WITH STABLE ANGINA PECTORIS (HCC): ICD-10-CM

## 2019-03-18 PROCEDURE — 78452 HT MUSCLE IMAGE SPECT MULT: CPT

## 2019-03-18 PROCEDURE — 78452 HT MUSCLE IMAGE SPECT MULT: CPT | Performed by: INTERNAL MEDICINE

## 2019-03-18 PROCEDURE — 93018 CV STRESS TEST I&R ONLY: CPT | Performed by: INTERNAL MEDICINE

## 2019-03-18 PROCEDURE — 93017 CV STRESS TEST TRACING ONLY: CPT

## 2019-03-18 PROCEDURE — A9500 TC99M SESTAMIBI: HCPCS | Performed by: INTERNAL MEDICINE

## 2019-03-18 PROCEDURE — 0 TECHNETIUM SESTAMIBI: Performed by: INTERNAL MEDICINE

## 2019-03-18 RX ADMIN — TECHNETIUM TC 99M SESTAMIBI 1 DOSE: 1 INJECTION INTRAVENOUS at 08:25

## 2019-03-18 RX ADMIN — TECHNETIUM TC 99M SESTAMIBI 1 DOSE: 1 INJECTION INTRAVENOUS at 10:40

## 2019-03-20 LAB
BH CV NUCLEAR PRIOR STUDY: 1
BH CV STRESS BP STAGE 1: NORMAL
BH CV STRESS BP STAGE 2: NORMAL
BH CV STRESS BP STAGE 3: NORMAL
BH CV STRESS BP STAGE 4: NORMAL
BH CV STRESS DURATION MIN STAGE 1: 3
BH CV STRESS DURATION MIN STAGE 2: 3
BH CV STRESS DURATION MIN STAGE 3: 3
BH CV STRESS DURATION MIN STAGE 4: 0
BH CV STRESS DURATION SEC STAGE 1: 0
BH CV STRESS DURATION SEC STAGE 2: 0
BH CV STRESS DURATION SEC STAGE 3: 0
BH CV STRESS DURATION SEC STAGE 4: 46
BH CV STRESS GRADE STAGE 1: 10
BH CV STRESS GRADE STAGE 2: 12
BH CV STRESS GRADE STAGE 3: 14
BH CV STRESS GRADE STAGE 4: 16
BH CV STRESS HR STAGE 1: 104
BH CV STRESS HR STAGE 2: 116
BH CV STRESS HR STAGE 3: 137
BH CV STRESS HR STAGE 4: 149
BH CV STRESS METS STAGE 1: 5
BH CV STRESS METS STAGE 2: 7.5
BH CV STRESS METS STAGE 3: 10
BH CV STRESS METS STAGE 4: 10.7
BH CV STRESS O2 STAGE 3: 97
BH CV STRESS O2 STAGE 4: 96
BH CV STRESS PROTOCOL 1: NORMAL
BH CV STRESS RECOVERY BP: NORMAL MMHG
BH CV STRESS RECOVERY HR: 74 BPM
BH CV STRESS RECOVERY O2: 97 %
BH CV STRESS SPEED STAGE 1: 1.7
BH CV STRESS SPEED STAGE 2: 2.5
BH CV STRESS SPEED STAGE 3: 3.4
BH CV STRESS SPEED STAGE 4: 4.2
BH CV STRESS STAGE 1: 1
BH CV STRESS STAGE 2: 2
BH CV STRESS STAGE 3: 3
BH CV STRESS STAGE 4: 4
LV EF NUC BP: 69 %
MAXIMAL PREDICTED HEART RATE: 172 BPM
PERCENT MAX PREDICTED HR: 87.21 %
STRESS BASELINE BP: NORMAL MMHG
STRESS BASELINE HR: 59 BPM
STRESS O2 SAT REST: 97 %
STRESS PERCENT HR: 103 %
STRESS POST ESTIMATED WORKLOAD: 10.7 METS
STRESS POST EXERCISE DUR MIN: 9 MIN
STRESS POST EXERCISE DUR SEC: 46 SEC
STRESS POST O2 SAT PEAK: 96 %
STRESS POST PEAK BP: NORMAL MMHG
STRESS POST PEAK HR: 150 BPM
STRESS TARGET HR: 146 BPM

## 2019-05-23 ENCOUNTER — HOSPITAL ENCOUNTER (OUTPATIENT)
Dept: MAMMOGRAPHY | Facility: HOSPITAL | Age: 49
Discharge: HOME | End: 2019-05-23
Admitting: FAMILY MEDICINE

## 2019-05-23 DIAGNOSIS — Z12.39 SCREENING BREAST EXAMINATION: ICD-10-CM

## 2019-05-23 PROCEDURE — 77067 SCR MAMMO BI INCL CAD: CPT

## 2019-05-23 PROCEDURE — 77063 BREAST TOMOSYNTHESIS BI: CPT

## 2019-05-23 PROCEDURE — 77063 BREAST TOMOSYNTHESIS BI: CPT | Performed by: RADIOLOGY

## 2019-05-23 PROCEDURE — 77067 SCR MAMMO BI INCL CAD: CPT | Performed by: RADIOLOGY

## 2019-06-06 ENCOUNTER — OFFICE VISIT (OUTPATIENT)
Dept: ONCOLOGY | Facility: CLINIC | Age: 49
End: 2019-06-06

## 2019-06-06 VITALS
HEIGHT: 63 IN | TEMPERATURE: 98.5 F | WEIGHT: 110 LBS | SYSTOLIC BLOOD PRESSURE: 117 MMHG | HEART RATE: 80 BPM | OXYGEN SATURATION: 98 % | BODY MASS INDEX: 19.49 KG/M2 | DIASTOLIC BLOOD PRESSURE: 74 MMHG

## 2019-06-06 DIAGNOSIS — I70.90 ATHEROSCLEROSIS: ICD-10-CM

## 2019-06-06 DIAGNOSIS — D68.51 FACTOR 5 LEIDEN MUTATION, HETEROZYGOUS (HCC): ICD-10-CM

## 2019-06-06 DIAGNOSIS — E72.11 HYPERHOMOCYSTEINEMIA (HCC): Primary | ICD-10-CM

## 2019-06-06 LAB
ALBUMIN SERPL-MCNC: 4.52 G/DL (ref 3.5–5.2)
ALBUMIN/GLOB SERPL: 1.8 G/DL
ALP SERPL-CCNC: 90 U/L (ref 39–117)
ALT SERPL W P-5'-P-CCNC: 12 U/L (ref 1–33)
ANION GAP SERPL CALCULATED.3IONS-SCNC: 11.7 MMOL/L
AST SERPL-CCNC: 13 U/L (ref 1–32)
BASOPHILS # BLD AUTO: 0.02 10*3/MM3 (ref 0–0.2)
BASOPHILS NFR BLD AUTO: 0.3 % (ref 0–1.5)
BILIRUB SERPL-MCNC: 0.4 MG/DL (ref 0.2–1.2)
BUN BLD-MCNC: 7 MG/DL (ref 6–20)
BUN/CREAT SERPL: 9.7 (ref 7–25)
CALCIUM SPEC-SCNC: 9.3 MG/DL (ref 8.6–10.5)
CHLORIDE SERPL-SCNC: 105 MMOL/L (ref 98–107)
CO2 SERPL-SCNC: 25.3 MMOL/L (ref 22–29)
CREAT BLD-MCNC: 0.72 MG/DL (ref 0.57–1)
DEPRECATED RDW RBC AUTO: 44.4 FL (ref 37–54)
EOSINOPHIL # BLD AUTO: 0.08 10*3/MM3 (ref 0–0.4)
EOSINOPHIL NFR BLD AUTO: 1.2 % (ref 0.3–6.2)
ERYTHROCYTE [DISTWIDTH] IN BLOOD BY AUTOMATED COUNT: 13.5 % (ref 12.3–15.4)
GFR SERPL CREATININE-BSD FRML MDRD: 86 ML/MIN/1.73
GLOBULIN UR ELPH-MCNC: 2.5 GM/DL
GLUCOSE BLD-MCNC: 99 MG/DL (ref 65–99)
HCT VFR BLD AUTO: 41.7 % (ref 34–46.6)
HCYS SERPL-MCNC: 29.4 UMOL/L (ref 0–15)
HGB BLD-MCNC: 14 G/DL (ref 12–15.9)
IMM GRANULOCYTES # BLD AUTO: 0.01 10*3/MM3 (ref 0–0.05)
IMM GRANULOCYTES NFR BLD AUTO: 0.1 % (ref 0–0.5)
LYMPHOCYTES # BLD AUTO: 1.6 10*3/MM3 (ref 0.7–3.1)
LYMPHOCYTES NFR BLD AUTO: 23.4 % (ref 19.6–45.3)
MCH RBC QN AUTO: 31.1 PG (ref 26.6–33)
MCHC RBC AUTO-ENTMCNC: 33.6 G/DL (ref 31.5–35.7)
MCV RBC AUTO: 92.7 FL (ref 79–97)
MONOCYTES # BLD AUTO: 0.48 10*3/MM3 (ref 0.1–0.9)
MONOCYTES NFR BLD AUTO: 7 % (ref 5–12)
NEUTROPHILS # BLD AUTO: 4.64 10*3/MM3 (ref 1.7–7)
NEUTROPHILS NFR BLD AUTO: 68 % (ref 42.7–76)
PLATELET # BLD AUTO: 195 10*3/MM3 (ref 140–450)
PMV BLD AUTO: 11.4 FL (ref 6–12)
POTASSIUM BLD-SCNC: 3.9 MMOL/L (ref 3.5–5.2)
PROT SERPL-MCNC: 7 G/DL (ref 6–8.5)
RBC # BLD AUTO: 4.5 10*6/MM3 (ref 3.77–5.28)
SODIUM BLD-SCNC: 142 MMOL/L (ref 136–145)
WBC NRBC COR # BLD: 6.83 10*3/MM3 (ref 3.4–10.8)

## 2019-06-06 PROCEDURE — 99215 OFFICE O/P EST HI 40 MIN: CPT | Performed by: INTERNAL MEDICINE

## 2019-06-06 PROCEDURE — 85025 COMPLETE CBC W/AUTO DIFF WBC: CPT | Performed by: INTERNAL MEDICINE

## 2019-06-06 PROCEDURE — 80053 COMPREHEN METABOLIC PANEL: CPT | Performed by: INTERNAL MEDICINE

## 2019-06-06 PROCEDURE — 36415 COLL VENOUS BLD VENIPUNCTURE: CPT | Performed by: INTERNAL MEDICINE

## 2019-06-06 PROCEDURE — 83090 ASSAY OF HOMOCYSTEINE: CPT | Performed by: INTERNAL MEDICINE

## 2019-06-06 RX ORDER — PRAVASTATIN SODIUM 20 MG
20 TABLET ORAL DAILY
COMMUNITY
End: 2020-02-21 | Stop reason: ALTCHOICE

## 2019-06-06 NOTE — PROGRESS NOTES
Name:  Jania Oconnor  :  1970  Date:  2019     PRIMARY CARE PHYSICIAN  Rissa Mason MD    REASON FOR FOLLOWUP  1. Hyperhomocysteinemia (CMS/HCC)    2. Factor 5 Leiden mutation, heterozygous (CMS/HCC)    3. Atherosclerosis       CHIEF COMPLAINT  None.    Dear Dr. Mason,    HISTORY OF PRESENT ILLNESS:   I saw Ms. Oconnor in follow up today in our hematology clinic. As you are aware, she is a pleasant, 48 y.o., white female with an extensive family history of heart disease and early-age heart attacks (and resulting deaths) who, in 2012, presented to the [facility] ER with a severe episode of chest pain. She was found to have an acute, anterior, ST-elevation MI. She was med-flighted to [facility], where a cardiac catheterization was performed and she was found to have 95% blockage in her LAD. Stents were placed, and she was discharged on ASA and Plavix. She quit smoking at that time. She has done fairly well from this standpoint since; however, by 2013, she was noted to have a significantly elevated (and rising) homocysteine level. Her previous PCP  ordered a hypercoagulable workup, started her on Coumadin and referred her to our clinic for further evaluation. The patient was initially evaluated by Dr. Castaneda on 11/15/2013, and a complete hypercoagulable panel was ordered.  I saw her for the first time in follow up in 2013. Between those visits, recommendations were made to continue lifelong anticoagulation (she has been doing well on Coumadin), B12 and folic acid (the latter two for her hereditary hyperhomocysteinemia, which likely played a large role in her early-age MI) in addition to ASA and Plavix. She was also referred to our genetic counselor.     INTERIM HISTORY:  Ms. Oconnor presents to clinic today by herself (after a two year absence) She has continued to do overall well since we last saw her in clinic. She remains on Coumadin and is still tolerating it  well. She had a repeat stress test within the past six months, and this was reportedly unremarkable. Due to some intermittent dizzy episodes and occasional bradycardia (with a HR reportedly occasionally in the 40s), cardiology has discussed placing a pacemaker. Otherwise, she has not had any cardiac issues since her initial MI in . She continues to have no new or specific complaints.     Past Medical History:   Diagnosis Date   • Anemia    • Anxiety    • Arthritis    • CHF (congestive heart failure) (CMS/[facility])    • Hyperlipidemia    • Metabolic disorder    • Myocardial infarction (CMS/[facility])    • Panic disorder        Past Surgical History:   Procedure Laterality Date   • CARDIAC CATHETERIZATION  2012   • DILATATION AND CURETTAGE     • HYSTERECTOMY     • SINUS SURGERY     • VASCULAR SURGERY         Social History     Socioeconomic History   • Marital status:      Spouse name: Not on file   • Number of children: Not on file   • Years of education: Not on file   • Highest education level: Not on file   Tobacco Use   • Smoking status: Former Smoker     Packs/day: 0.50     Years: 15.00     Pack years: 7.50     Types: Cigarettes     Last attempt to quit: 4/3/2012     Years since quittin.1   • Smokeless tobacco: Never Used   Substance and Sexual Activity   • Alcohol use: No   • Drug use: No   • Sexual activity: Defer       Family History   Problem Relation Age of Onset   • Lung cancer Mother    • Breast cancer Mother 47   • Heart attack Father 45        several MI's   • Heart failure Father    • Heart attack Sister 42   • Heart attack Brother 36   • Heart attack Paternal Uncle 40   • Heart attack Paternal Uncle 45   • Heart attack Paternal Uncle 45   • Heart attack Paternal Uncle 40   • Heart attack Paternal Uncle 45   • Heart attack Paternal Uncle 45       Allergies   Allergen Reactions   • Atorvastatin GI Intolerance   • Erythromycin GI Intolerance       Current Outpatient Medications    Medication Sig Dispense Refill   • ALPRAZolam (XANAX) 1 MG tablet Take 1 mg by mouth 3 (Three) Times a Day As Needed for Anxiety.     • aspirin 81 MG EC tablet Take 81 mg by mouth Daily.     • clopidogrel (PLAVIX) 75 MG tablet Take 75 mg by mouth Daily.     • coenzyme Q10 100 MG capsule Take 200 mg by mouth Daily.     • Cyanocobalamin ([employer] B-12) 1000 MCG/15ML liquid Take  by mouth Daily.     • folic acid (FOLVITE) 1 MG tablet Take 1 mg by mouth Daily.     • furosemide (LASIX) 40 MG tablet Take 40 mg by mouth Daily.     • nitroglycerin (NITROSTAT) 0.4 MG SL tablet Place 0.4 mg under the tongue Every 5 (Five) Minutes As Needed for Chest Pain. Take no more than 3 doses in 15 minutes.     • pravastatin (PRAVACHOL) 20 MG tablet Take 20 mg by mouth Daily.     • warfarin (COUMADIN) 10 MG tablet Take  by mouth Daily. Alternating between 8-10mg     • atorvastatin (LIPITOR) 40 MG tablet Take 1 tablet by mouth Daily. 60 tablet 0     No current facility-administered medications for this visit.      REVIEW OF SYSTEMS  CONSTITUTIONAL:  No fever, chills, night sweats or fatigue.  EYES:  No blurry vision, diplopia or other vision changes.  ENT:  No hearing loss, nosebleeds or sore throat.  CARDIOVASCULAR:  No palpitations or edema. Intermittent dizzy episodes and bradycardia, as per the HPI above.  PULMONARY:  No hemoptysis, wheezing, chronic cough or shortness of breath.  GASTROINTESTINAL:  No nausea or vomiting.  No constipation or diarrhea.  No abdominal pain.  GENITOURINARY:  No hematuria, kidney stones or frequent urination.  MUSCULOSKELETAL:  No joint or back pains.  INTEGUMENTARY: No rashes or pruritus.  ENDOCRINE:  No excessive thirst or hot flashes.  HEMATOLOGIC:  No history of free bleeding, spontaneous bleeding or clotting.  IMMUNOLOGIC:  No allergies or frequent infections.  NEUROLOGIC: No numbness, tingling, seizures or weakness.  PSYCHIATRIC:  No anxiety or depression.    PHYSICAL EXAMINATION  /74   Pulse 80   " Temp 98.5 °F (36.9 °C) (Oral)   Ht 160 cm (62.99\")   Wt 49.9 kg (110 lb)   SpO2 98%   BMI 19.49 kg/m²     GENERAL:  A well-developed, well-nourished, thin, white female in no acute distress.  HEENT:  Pupils equally round and reactive to light.  Extraocular muscles intact.  CARDIOVASCULAR:  Regular rate and rhythm.  No murmurs, gallops or rubs.  LUNGS:  Clear to auscultation bilaterally.  ABDOMEN:  Soft, nontender, nondistended with positive bowel sounds.  EXTREMITIES:  No clubbing, cyanosis or edema bilaterally.  SKIN:  No rashes or petechiae.  NEURO:  Cranial nerves grossly intact.  No focal deficits.  PSYCH:  Alert and oriented x3.    LABORATORY    Lab Results   Component Value Date    WBC 6.83 06/06/2019    HGB 14.0 06/06/2019    HCT 41.7 06/06/2019    MCV 92.7 06/06/2019     06/06/2019    NEUTROABS 4.64 06/06/2019       Lab Results   Component Value Date     06/06/2019    K 3.9 06/06/2019     06/06/2019    CO2 25.3 06/06/2019    BUN 7 06/06/2019    CREATININE 0.72 06/06/2019    GLUCOSE 99 06/06/2019    CALCIUM 9.3 06/06/2019    AST 13 06/06/2019    ALT 12 06/06/2019    ALKPHOS 90 06/06/2019    BILITOT 0.4 06/06/2019    PROTEINTOT 7.0 06/06/2019    ALBUMIN 4.52 06/06/2019     Extensive hypercoagulable workup on 11/15/2013 was negative (full results are available in our portal system) with the following exceptions:        Homocystine level, serum: > 65.0  UMOL/L (upper limit of normal: 13.9)   MTHFR, DNA analysis: Two copies of the same mutation (C677T and C677T) identified.   Factor II, DNA analysis: Single [id number] mutation identified       Homocystine level, serum (06/06/2019): pending  Homocystine level, serum (05/13/2016): 53.2 umol/L  Homocystine level, serum (05/11/2017): 24.5 umol/L    IMAGING    PATHOLOGY    IMPRESSION AND PLAN  Ms. Oconnor is a 48 y.o., white female with:  1. Hyperhomocysteinemia/hypercoagulable state: A significantly increased level (> 65 UMOL/L) was confirmed at " the time of her hypercoagulable workup in 2013, and the results of the MTHFR DNA analysis provides an explanation for this. She has two copies of the C677T mutation, which has been shown to be associated with elevated homocysteine levels. Most importantly, this elevated homocysteine level is probably playing a major role in her (and, almost certainly, her family's) early-onset heart disease. Though the process is not fully understood, it is believed that elevated homocysteine levels contribute to chronic arterial wall inflammation. This exacerbates the effect of cholesterol, hypertension and other commonly associated risk factors for CAD, ultimately leading to premature plaque buildups and, consequently, earlier onset, premature heart disease and heart attacks. 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 While this is the second most common cause of an inherited thrombophilia, her risk of venous clotting (such as DVTs or PEs) appears to be a relatively minor issue (although at her initial consultation  with us she reported a history of a “lung clot”, a review of her records from April 2012 showed that her cardiac event appeared to be her only diagnosis). This explained why she had never been on Coumadin (but rather, just ASA and Plavix) before she was started on it in October 2013 for her homocysteine levels. All of this being said, her factor II gene mutation is certainly not helping her coronary artery (or venous) thrombotic risk. I have had multiple, long discussions with the patient regarding these findings and issues (including, again, today). There is, unfortunately, no known way to completely correct her hyperhomocysteinemia (just as there is no “cure” for the presence of the mutated genes that are  leading to it). The Vitamin B and folic acid supplementation she has been on for years now (and remains on) has helped some (by decreasing her homocysteine levels and, in  turn, hopefully reducing the associated vessel inflammation); but, as it is, not surprisingly, still elevated (24.5 umol/L as of 05/11/2017, though this is much improved compared to in previous years; today's result is pending), continuing B12 and folic acid, continuing to address her controllable risk factors (ongoing abstinence from smoking, statin dosing, etc.) and continuing ASA, Plavix and full anticoagulation (she continues to tolerate Coumadin well) as she has been doing, are all still extremely important (and will provide the maximum intervention/preventative therapy possible). She will, of course, continue to follow closely with her cardiologist as well. We will see her back in our clinic in one year.  2. Genetic risk: The patient' s daughter all but certainly inherited one of the MTHFR C677T mutations (which might increase her longer term cardiac risk just as it did her mother's) and has a 50% chance of having her mother's copy of the prothrombin [id number] mutation (which, as mentioned, is the second most common cause of an inherited, mild thrombophilia). Particularly since her daughter is still in her early twenties, neither of these are likely reason enough to place her (the daughter) on antiplatelet drugs or full anticoagulation immediately, but they continue to be something of which they (the patient and her daughter) are aware. The patient still plans to continue to followup with our genetic counselor periodically, who can further discuss the risks vs. benefits, as well as the appropriate timing, of family member testing.   The patient was in agreement with these plans.    ACO Quality Measures:  a) The patient does not currently use tobacco products (she quit smoking in 2012, at the time of her initial MI).  b) The patient's BMI is within normal parameters.  c) The current outpatient and discharge medications have been reconciled for the patient.    It is a pleasure to participate in Ms. Oconnor's care.  Please do not hesitate to call with any questions or concerns that you may have.    A total of 50 minutes were spent coordinating this patient’s care in clinic today; more than 50% of this time was face-to-face with the patient, reviewing her interim medical history and counseling on the current treatment recommendations and followup plan. All questions were answered to her satisfaction.    FOLLOW UP  Continue ASA, Plavix, Coumadin, B12 and folic acid. With cardiology and her PCP as previously planned. Return to our clinic in 1 year with a repeat homocysteine level.        This document was electronically signed by YURI Perkins MD June 6, 2019 2:34 PM      CC: MD Reinier Cantu IV, MD

## 2020-02-21 ENCOUNTER — HOSPITAL ENCOUNTER (OUTPATIENT)
Dept: CARDIOLOGY | Facility: HOSPITAL | Age: 50
Discharge: HOME | End: 2020-02-21
Admitting: INTERNAL MEDICINE

## 2020-02-21 ENCOUNTER — OFFICE VISIT (OUTPATIENT)
Dept: CARDIOLOGY | Facility: CLINIC | Age: 50
End: 2020-02-21

## 2020-02-21 VITALS
SYSTOLIC BLOOD PRESSURE: 122 MMHG | BODY MASS INDEX: 20.13 KG/M2 | OXYGEN SATURATION: 98 % | WEIGHT: 109.4 LBS | DIASTOLIC BLOOD PRESSURE: 78 MMHG | HEART RATE: 94 BPM | HEIGHT: 62 IN

## 2020-02-21 DIAGNOSIS — R00.2 PALPITATIONS: ICD-10-CM

## 2020-02-21 DIAGNOSIS — E78.5 HYPERLIPIDEMIA LDL GOAL <70: ICD-10-CM

## 2020-02-21 DIAGNOSIS — I25.119 CORONARY ARTERY DISEASE INVOLVING NATIVE CORONARY ARTERY OF NATIVE HEART WITH ANGINA PECTORIS (HCC): Primary | ICD-10-CM

## 2020-02-21 PROBLEM — R00.1 BRADYCARDIA: Status: RESOLVED | Noted: 2019-02-15 | Resolved: 2020-02-21

## 2020-02-21 PROCEDURE — 93000 ELECTROCARDIOGRAM COMPLETE: CPT | Performed by: INTERNAL MEDICINE

## 2020-02-21 PROCEDURE — 93306 TTE W/DOPPLER COMPLETE: CPT | Performed by: INTERNAL MEDICINE

## 2020-02-21 PROCEDURE — 93306 TTE W/DOPPLER COMPLETE: CPT

## 2020-02-21 PROCEDURE — 99214 OFFICE O/P EST MOD 30 MIN: CPT | Performed by: INTERNAL MEDICINE

## 2020-02-21 RX ORDER — NITROGLYCERIN 0.4 MG/1
0.4 TABLET SUBLINGUAL
Qty: 25 TABLET | Refills: 5 | Status: SHIPPED | OUTPATIENT
Start: 2020-02-21 | End: 2021-06-23 | Stop reason: SDUPTHER

## 2020-02-21 RX ORDER — METOPROLOL SUCCINATE 25 MG/1
25 TABLET, EXTENDED RELEASE ORAL DAILY
Qty: 90 TABLET | Refills: 3 | Status: SHIPPED | OUTPATIENT
Start: 2020-02-21 | End: 2020-10-14 | Stop reason: SDUPTHER

## 2020-02-21 RX ORDER — ROSUVASTATIN CALCIUM 40 MG/1
40 TABLET, COATED ORAL DAILY
Qty: 90 TABLET | Refills: 3 | Status: SHIPPED | OUTPATIENT
Start: 2020-02-21 | End: 2020-10-14 | Stop reason: SDUPTHER

## 2020-02-21 RX ORDER — ASPIRIN 81 MG/1
81 TABLET ORAL DAILY
Start: 2020-02-21 | End: 2021-06-23 | Stop reason: SDUPTHER

## 2020-02-21 NOTE — ASSESSMENT & PLAN NOTE
· Not controlled  · Discontinue pravastatin  · Start rosuvastatin 40 mg nightly  · CMP and lipids in 6-week

## 2020-02-21 NOTE — ASSESSMENT & PLAN NOTE
· Daily palpitation symptoms  · Start metoprolol succinate 25 mg daily  · Obtain echocardiogram  · Obtain CBC and TSH  · If palpitation symptoms persist, 14-day Holter monitoring will be performed

## 2020-02-21 NOTE — PROGRESS NOTES
[facility] at [facility]  Follow-up note    Encounter Date:02/21/2020    Patient ID: Jania Oconnor is a  49 y.o. female who resides in [place], [place].    CC/Reason for visit:  Coronary Artery Disease; Chest Pain; Shortness of Breath; and panic attacks           Problem List Items Addressed This Visit        Cardiology Problems    Coronary artery disease involving native coronary artery of native heart with angina pectoris (CMS/HCC) - Primary    Overview     · Cardiac catheterization for anterior MI/out-of-hospital cardiac arrest (04/03/2012): Bare metal stent to LAD.  LVEF 50-55%.  · Pharmacologic nuclear stress test (11/03/2014): Negative for ischemia or scar.  LVEF 66%  · Echo (4/21/2017): Normal LVEF.  Significant valvular abnormality  · Nuclear stress test (03/20/2019): No evidence of ischemia. LVEF 69%.          Current Assessment & Plan     · No anginal symptoms and normal nuclear stress test last March  · Continue low-dose aspirin.  Clopidogrel not indicated on basis of CAD.  Hematology has recommended clopidogrel in addition to warfarin for hypercoagulable state  · Start metoprolol and intensify statin therapy         Relevant Medications    aspirin 81 MG EC tablet    nitroglycerin (NITROSTAT) 0.4 MG SL tablet    metoprolol succinate XL (TOPROL-XL) 25 MG 24 hr tablet    Hyperlipidemia LDL goal <70    Overview     · High intensity statin therapy indicated given presence coronary artery disease         Current Assessment & Plan     · Not controlled  · Discontinue pravastatin  · Start rosuvastatin 40 mg nightly  · CMP and lipids in 6-week         Relevant Medications    rosuvastatin (CRESTOR) 40 MG tablet    Other Relevant Orders    Comprehensive Metabolic Panel    Lipid Panel    Palpitations    Overview     · Zio patch (3/15/2017): Sinus rhythm.  Short episodes of atrial tachycardia.  No significant arrhythmia.         Current Assessment & Plan     · Daily palpitation  symptoms  · Start metoprolol succinate 25 mg daily  · Obtain echocardiogram  · Obtain CBC and TSH  · If palpitation symptoms persist, 14-day Holter monitoring will be performed         Relevant Medications    metoprolol succinate XL (TOPROL-XL) 25 MG 24 hr tablet    Other Relevant Orders    TSH+Free T4    Adult Transthoracic Echo Complete W/ Cont if Necessary Per Protocol    CBC (No Diff)               · Discontinue pravastatin, coenzyme Q 10, and furosemide  · Start metoprolol succinate 25 mg daily  · Start rosuvastatin 40 mg nightly.  Obtain CMP, lipids in 6 weeks  · Echocardiogram to evaluate recent spell and palpitations  · Obtain CBC and TSH for palpitation  Return in about 6 months (around 8/21/2020).            Jania Oconnor is a 49 y.o. female who returns to my office for follow-up of her coronary artery disease and cardiac risk factors.  The patient states that last week she had an episode where she developed tunnel vision.  This lasted for several minutes and resolve spontaneously.  She thought she was having a heart attack but did not seek emergency room care.  She has not had an episode like this for years and is had not had any recurrence since.  She does describe daily palpitation symptoms but did not feel palpitations with this episode.    The patient states that she is otherwise active and denies chest discomfort with physical activities.    Review of Systems   Constitution: Negative for malaise/fatigue.   Eyes: Negative for vision loss in left eye and vision loss in right eye.   Cardiovascular: Positive for chest pain, dyspnea on exertion, irregular heartbeat, leg swelling, near-syncope and palpitations. Negative for orthopnea, paroxysmal nocturnal dyspnea and syncope.   Respiratory: Positive for cough, shortness of breath and wheezing.    Musculoskeletal: Negative for myalgias.   Neurological: Negative for brief paralysis, excessive daytime sleepiness, focal weakness, numbness, paresthesias and  "weakness.   All other systems reviewed and are negative.      The patient's past medical, social, family history and ROS reviewed in the patient's electronic medical record.    Allergies  Atorvastatin and Erythromycin    Outpatient Medications Marked as Taking for the 2/21/20 encounter (Office Visit) with Reinier Mccauley IV, MD   Medication Sig Dispense Refill   • ALPRAZolam (XANAX) 1 MG tablet Take 1 mg by mouth 3 (Three) Times a Day As Needed for Anxiety.     • aspirin 81 MG EC tablet Take 1 tablet by mouth Daily.     • clopidogrel (PLAVIX) 75 MG tablet Take 75 mg by mouth Daily.     • Cyanocobalamin ([employer] B-12) 1000 MCG/15ML liquid Take  by mouth Daily.     • nitroglycerin (NITROSTAT) 0.4 MG SL tablet Place 1 tablet under the tongue Every 5 (Five) Minutes As Needed for Chest Pain. Take no more than 3 doses in 15 minutes. 25 tablet 5   • warfarin (COUMADIN) 10 MG tablet Take  by mouth Daily. Alternating between 8-10mg     • [DISCONTINUED] aspirin 81 MG EC tablet Take 81 mg by mouth Daily.     • [DISCONTINUED] coenzyme Q10 100 MG capsule Take 200 mg by mouth Daily.     • [DISCONTINUED] furosemide (LASIX) 40 MG tablet Take 40 mg by mouth Daily.     • [DISCONTINUED] nitroglycerin (NITROSTAT) 0.4 MG SL tablet Place 0.4 mg under the tongue Every 5 (Five) Minutes As Needed for Chest Pain. Take no more than 3 doses in 15 minutes.     • [DISCONTINUED] pravastatin (PRAVACHOL) 20 MG tablet Take 20 mg by mouth Daily.             Blood pressure 122/78, pulse 94, height 157.5 cm (62\"), weight 49.6 kg (109 lb 6.4 oz), SpO2 98 %.  Body mass index is 20.01 kg/m².  Vitals:    02/21/20 1407   Patient Position: Sitting       Physical Exam   Constitutional: She is oriented to person, place, and time. She appears well-developed and well-nourished.   HENT:   Head: Normocephalic and atraumatic.   Eyes: Pupils are equal, round, and reactive to light. No scleral icterus.   Neck: No JVD present. Carotid bruit is not present. No " thyromegaly present.   Cardiovascular: Normal rate, regular rhythm, S1 normal and S2 normal. Exam reveals no gallop.   No murmur heard.  Pulmonary/Chest: Effort normal and breath sounds normal.   Abdominal: Soft. She exhibits no mass. There is no hepatosplenomegaly. There is no tenderness.   Neurological: She is alert and oriented to person, place, and time.   Skin: Skin is warm and dry. No cyanosis. Nails show no clubbing.   Psychiatric: She has a normal mood and affect. Her behavior is normal.       Data Review (reviewed with patient):       ECG 12 Lead  Date/Time: 2/25/2020 10:38 AM  Performed by: Reinier Mccauley IV, MD  Authorized by: Reinier Mccauley IV, MD   Comparison: compared with previous ECG from 2/15/2019  Similar to previous ECG  Rhythm: sinus rhythm  Rate: normal  BPM: 72  QRS axis: normal    Clinical impression: normal ECG            Lab Results   Component Value Date    GLUCOSE 99 06/06/2019    BUN 7 06/06/2019    CREATININE 0.72 06/06/2019    EGFRIFNONA 86 06/06/2019    BCR 9.7 06/06/2019    K 3.9 06/06/2019    CO2 25.3 06/06/2019    CALCIUM 9.3 06/06/2019    ALBUMIN 4.52 06/06/2019    AST 13 06/06/2019    ALT 12 06/06/2019      FLP, 03/06/2019:  ·   ·   · HDL 37  ·       H. C. Howard Mccauley MD [facility]    Interventional and General Cardiology   2/21/2020

## 2020-02-24 LAB
BH CV ECHO MEAS - AO MAX PG (FULL): -0.2 MMHG
BH CV ECHO MEAS - AO MAX PG: 5 MMHG
BH CV ECHO MEAS - AO MEAN PG (FULL): 1 MMHG
BH CV ECHO MEAS - AO MEAN PG: 3 MMHG
BH CV ECHO MEAS - AO ROOT AREA (BSA CORRECTED): 2.2
BH CV ECHO MEAS - AO ROOT AREA: 7.5 CM^2
BH CV ECHO MEAS - AO ROOT DIAM: 3.1 CM
BH CV ECHO MEAS - AO V2 MAX: 117 CM/SEC
BH CV ECHO MEAS - AO V2 MEAN: 74.3 CM/SEC
BH CV ECHO MEAS - AO V2 VTI: 26.9 CM
BH CV ECHO MEAS - AVA(I,A): 2.2 CM^2
BH CV ECHO MEAS - AVA(I,D): 2.2 CM^2
BH CV ECHO MEAS - AVA(V,A): 2.5 CM^2
BH CV ECHO MEAS - AVA(V,D): 2.5 CM^2
BH CV ECHO MEAS - BSA(HAYCOCK): 1.4 M^2
BH CV ECHO MEAS - BSA: 1.4 M^2
BH CV ECHO MEAS - BZI_BMI: 18.7 KILOGRAMS/M^2
BH CV ECHO MEAS - BZI_METRIC_HEIGHT: 157.5 CM
BH CV ECHO MEAS - BZI_METRIC_WEIGHT: 46.3 KG
BH CV ECHO MEAS - EDV(CUBED): 52.7 ML
BH CV ECHO MEAS - EDV(MOD-SP2): 50 ML
BH CV ECHO MEAS - EDV(MOD-SP4): 45 ML
BH CV ECHO MEAS - EDV(TEICH): 60 ML
BH CV ECHO MEAS - EF(CUBED): 63.5 %
BH CV ECHO MEAS - EF(MOD-BP): 60 %
BH CV ECHO MEAS - EF(MOD-SP2): 58 %
BH CV ECHO MEAS - EF(MOD-SP4): 60 %
BH CV ECHO MEAS - EF(TEICH): 55.8 %
BH CV ECHO MEAS - ESV(CUBED): 19.2 ML
BH CV ECHO MEAS - ESV(MOD-SP2): 21 ML
BH CV ECHO MEAS - ESV(MOD-SP4): 18 ML
BH CV ECHO MEAS - ESV(TEICH): 26.5 ML
BH CV ECHO MEAS - FS: 28.5 %
BH CV ECHO MEAS - IVS/LVPW: 0.83
BH CV ECHO MEAS - IVSD: 0.76 CM
BH CV ECHO MEAS - LA DIMENSION: 2.6 CM
BH CV ECHO MEAS - LA/AO: 0.82
BH CV ECHO MEAS - LAD MAJOR: 3.9 CM
BH CV ECHO MEAS - LAT PEAK E' VEL: 13.2 CM/SEC
BH CV ECHO MEAS - LATERAL E/E' RATIO: 6
BH CV ECHO MEAS - LV DIASTOLIC VOL/BSA (35-75): 31.3 ML/M^2
BH CV ECHO MEAS - LV MASS(C)D: 90 GRAMS
BH CV ECHO MEAS - LV MASS(C)DI: 62.7 GRAMS/M^2
BH CV ECHO MEAS - LV MAX PG: 5.2 MMHG
BH CV ECHO MEAS - LV MEAN PG: 2 MMHG
BH CV ECHO MEAS - LV SYSTOLIC VOL/BSA (12-30): 12.5 ML/M^2
BH CV ECHO MEAS - LV V1 MAX: 114 CM/SEC
BH CV ECHO MEAS - LV V1 MEAN: 65.1 CM/SEC
BH CV ECHO MEAS - LV V1 VTI: 23.2 CM
BH CV ECHO MEAS - LVIDD: 3.8 CM
BH CV ECHO MEAS - LVIDS: 2.7 CM
BH CV ECHO MEAS - LVLD AP2: 7 CM
BH CV ECHO MEAS - LVLD AP4: 6.9 CM
BH CV ECHO MEAS - LVLS AP2: 5.6 CM
BH CV ECHO MEAS - LVLS AP4: 5.5 CM
BH CV ECHO MEAS - LVOT AREA (M): 2.5 CM^2
BH CV ECHO MEAS - LVOT AREA: 2.5 CM^2
BH CV ECHO MEAS - LVOT DIAM: 1.8 CM
BH CV ECHO MEAS - LVPWD: 0.92 CM
BH CV ECHO MEAS - MED PEAK E' VEL: 11.6 CM/SEC
BH CV ECHO MEAS - MEDIAL E/E' RATIO: 6.9
BH CV ECHO MEAS - MV A MAX VEL: 55.3 CM/SEC
BH CV ECHO MEAS - MV DEC TIME: 0.27 SEC
BH CV ECHO MEAS - MV E MAX VEL: 79.5 CM/SEC
BH CV ECHO MEAS - MV E/A: 1.4
BH CV ECHO MEAS - PA ACC SLOPE: 482 CM/SEC^2
BH CV ECHO MEAS - PA ACC TIME: 0.19 SEC
BH CV ECHO MEAS - PA PR(ACCEL): -5.2 MMHG
BH CV ECHO MEAS - RAP SYSTOLE: 3 MMHG
BH CV ECHO MEAS - RVSP: 21.8 MMHG
BH CV ECHO MEAS - SI(AO): 141.4 ML/M^2
BH CV ECHO MEAS - SI(CUBED): 23.3 ML/M^2
BH CV ECHO MEAS - SI(LVOT): 41.1 ML/M^2
BH CV ECHO MEAS - SI(MOD-SP2): 20.2 ML/M^2
BH CV ECHO MEAS - SI(MOD-SP4): 18.8 ML/M^2
BH CV ECHO MEAS - SI(TEICH): 23.3 ML/M^2
BH CV ECHO MEAS - SV(AO): 203 ML
BH CV ECHO MEAS - SV(CUBED): 33.5 ML
BH CV ECHO MEAS - SV(LVOT): 59 ML
BH CV ECHO MEAS - SV(MOD-SP2): 29 ML
BH CV ECHO MEAS - SV(MOD-SP4): 27 ML
BH CV ECHO MEAS - SV(TEICH): 33.5 ML
BH CV ECHO MEAS - TAPSE (>1.6): 2.3 CM2
BH CV ECHO MEAS - TR MAX PG: 18.8 MMHG
BH CV ECHO MEAS - TR MAX VEL: 217 CM/SEC
BH CV ECHO MEASUREMENTS AVERAGE E/E' RATIO: 6.41
BH CV XLRA - RV BASE: 3.4 CM
BH CV XLRA - RV LENGTH: 4.9 CM
BH CV XLRA - RV MID: 2.7 CM
LEFT ATRIUM VOLUME INDEX: 13.9 ML/M^2
LEFT ATRIUM VOLUME: 20 ML
LV EF 2D ECHO EST: 60 %

## 2020-10-14 ENCOUNTER — HOSPITAL ENCOUNTER (OUTPATIENT)
Dept: CARDIOLOGY | Facility: HOSPITAL | Age: 50
Discharge: HOME | End: 2020-10-14
Admitting: INTERNAL MEDICINE

## 2020-10-14 ENCOUNTER — OFFICE VISIT (OUTPATIENT)
Dept: CARDIOLOGY | Facility: CLINIC | Age: 50
End: 2020-10-14

## 2020-10-14 VITALS
BODY MASS INDEX: 20.98 KG/M2 | SYSTOLIC BLOOD PRESSURE: 118 MMHG | DIASTOLIC BLOOD PRESSURE: 78 MMHG | TEMPERATURE: 97.5 F | HEIGHT: 62 IN | HEART RATE: 86 BPM | WEIGHT: 114 LBS | OXYGEN SATURATION: 97 %

## 2020-10-14 DIAGNOSIS — R00.2 PALPITATIONS: ICD-10-CM

## 2020-10-14 DIAGNOSIS — R06.09 DYSPNEA ON EXERTION: ICD-10-CM

## 2020-10-14 DIAGNOSIS — I25.119 CORONARY ARTERY DISEASE INVOLVING NATIVE CORONARY ARTERY OF NATIVE HEART WITH ANGINA PECTORIS (HCC): Primary | ICD-10-CM

## 2020-10-14 DIAGNOSIS — D68.51 FACTOR 5 LEIDEN MUTATION, HETEROZYGOUS (HCC): ICD-10-CM

## 2020-10-14 DIAGNOSIS — E78.5 HYPERLIPIDEMIA LDL GOAL <70: ICD-10-CM

## 2020-10-14 PROBLEM — E72.11 HYPERHOMOCYSTEINEMIA: Status: RESOLVED | Noted: 2017-05-11 | Resolved: 2020-10-14

## 2020-10-14 PROCEDURE — 93306 TTE W/DOPPLER COMPLETE: CPT | Performed by: INTERNAL MEDICINE

## 2020-10-14 PROCEDURE — 99214 OFFICE O/P EST MOD 30 MIN: CPT | Performed by: INTERNAL MEDICINE

## 2020-10-14 PROCEDURE — 93306 TTE W/DOPPLER COMPLETE: CPT

## 2020-10-14 RX ORDER — WARFARIN SODIUM 10 MG/1
TABLET ORAL
Start: 2020-10-14

## 2020-10-14 RX ORDER — ROSUVASTATIN CALCIUM 40 MG/1
40 TABLET, COATED ORAL DAILY
Qty: 90 TABLET | Refills: 3 | Status: SHIPPED | OUTPATIENT
Start: 2020-10-14

## 2020-10-14 RX ORDER — METOPROLOL SUCCINATE 25 MG/1
25 TABLET, EXTENDED RELEASE ORAL DAILY
Qty: 90 TABLET | Refills: 3 | Status: SHIPPED | OUTPATIENT
Start: 2020-10-14 | End: 2021-06-23 | Stop reason: SDUPTHER

## 2020-10-14 RX ORDER — CLOPIDOGREL BISULFATE 75 MG/1
75 TABLET ORAL DAILY
Qty: 30 TABLET
Start: 2020-10-14 | End: 2021-06-23 | Stop reason: SDUPTHER

## 2020-10-14 NOTE — ASSESSMENT & PLAN NOTE
· No classic anginal chest pain symptoms, but patient reports worsening exertional shortness of breath  · Continue low-dose antiplatelet, beta-blocker, and statin therapy

## 2020-10-15 LAB
ALBUMIN SERPL-MCNC: 4.6 G/DL (ref 3.5–5.2)
ALBUMIN/GLOB SERPL: 2.2 G/DL
ALP SERPL-CCNC: 100 U/L (ref 39–117)
ALT SERPL-CCNC: 23 U/L (ref 1–33)
AST SERPL-CCNC: 21 U/L (ref 1–32)
BH CV ECHO MEAS - AO MAX PG (FULL): -1.5 MMHG
BH CV ECHO MEAS - AO MAX PG: 4 MMHG
BH CV ECHO MEAS - AO MEAN PG (FULL): 0 MMHG
BH CV ECHO MEAS - AO MEAN PG: 2 MMHG
BH CV ECHO MEAS - AO ROOT AREA (BSA CORRECTED): 2.1
BH CV ECHO MEAS - AO ROOT AREA: 8 CM^2
BH CV ECHO MEAS - AO ROOT DIAM: 3.2 CM
BH CV ECHO MEAS - AO V2 MAX: 105 CM/SEC
BH CV ECHO MEAS - AO V2 MEAN: 61.8 CM/SEC
BH CV ECHO MEAS - AO V2 VTI: 21 CM
BH CV ECHO MEAS - ASC AORTA: 3.3 CM
BH CV ECHO MEAS - AVA(I,A): 2.2 CM^2
BH CV ECHO MEAS - AVA(I,D): 2.2 CM^2
BH CV ECHO MEAS - AVA(V,A): 2.2 CM^2
BH CV ECHO MEAS - AVA(V,D): 2.2 CM^2
BH CV ECHO MEAS - BSA(HAYCOCK): 1.5 M^2
BH CV ECHO MEAS - BSA: 1.5 M^2
BH CV ECHO MEAS - BZI_BMI: 20.9 KILOGRAMS/M^2
BH CV ECHO MEAS - BZI_METRIC_HEIGHT: 157.5 CM
BH CV ECHO MEAS - BZI_METRIC_WEIGHT: 51.7 KG
BH CV ECHO MEAS - EDV(CUBED): 49.8 ML
BH CV ECHO MEAS - EDV(MOD-SP2): 38 ML
BH CV ECHO MEAS - EDV(MOD-SP4): 54 ML
BH CV ECHO MEAS - EDV(TEICH): 57.4 ML
BH CV ECHO MEAS - EF(CUBED): 74 %
BH CV ECHO MEAS - EF(MOD-BP): 60 %
BH CV ECHO MEAS - EF(MOD-SP2): 57.9 %
BH CV ECHO MEAS - EF(MOD-SP4): 63 %
BH CV ECHO MEAS - EF(TEICH): 66.7 %
BH CV ECHO MEAS - ESV(CUBED): 13 ML
BH CV ECHO MEAS - ESV(MOD-SP2): 16 ML
BH CV ECHO MEAS - ESV(MOD-SP4): 20 ML
BH CV ECHO MEAS - ESV(TEICH): 19.1 ML
BH CV ECHO MEAS - FS: 36.1 %
BH CV ECHO MEAS - IVS/LVPW: 1.1
BH CV ECHO MEAS - IVSD: 0.91 CM
BH CV ECHO MEAS - LA DIMENSION: 3.1 CM
BH CV ECHO MEAS - LA/AO: 0.95
BH CV ECHO MEAS - LAD MAJOR: 4.4 CM
BH CV ECHO MEAS - LAT PEAK E' VEL: 15.1 CM/SEC
BH CV ECHO MEAS - LATERAL E/E' RATIO: 5.4
BH CV ECHO MEAS - LV DIASTOLIC VOL/BSA (35-75): 35.9 ML/M^2
BH CV ECHO MEAS - LV MASS(C)D: 92.6 GRAMS
BH CV ECHO MEAS - LV MASS(C)DI: 61.5 GRAMS/M^2
BH CV ECHO MEAS - LV MAX PG: 5.5 MMHG
BH CV ECHO MEAS - LV MEAN PG: 2 MMHG
BH CV ECHO MEAS - LV SYSTOLIC VOL/BSA (12-30): 13.3 ML/M^2
BH CV ECHO MEAS - LV V1 MAX: 117 CM/SEC
BH CV ECHO MEAS - LV V1 MEAN: 61.1 CM/SEC
BH CV ECHO MEAS - LV V1 VTI: 22.9 CM
BH CV ECHO MEAS - LVIDD: 3.7 CM
BH CV ECHO MEAS - LVIDS: 2.4 CM
BH CV ECHO MEAS - LVLD AP2: 6.8 CM
BH CV ECHO MEAS - LVLD AP4: 6.9 CM
BH CV ECHO MEAS - LVLS AP2: 5.8 CM
BH CV ECHO MEAS - LVLS AP4: 5.1 CM
BH CV ECHO MEAS - LVOT AREA (M): 2 CM^2
BH CV ECHO MEAS - LVOT AREA: 2 CM^2
BH CV ECHO MEAS - LVOT DIAM: 1.6 CM
BH CV ECHO MEAS - LVPWD: 0.85 CM
BH CV ECHO MEAS - MED PEAK E' VEL: 9.2 CM/SEC
BH CV ECHO MEAS - MEDIAL E/E' RATIO: 8.8
BH CV ECHO MEAS - MV A MAX VEL: 63.2 CM/SEC
BH CV ECHO MEAS - MV DEC SLOPE: 420 CM/SEC^2
BH CV ECHO MEAS - MV DEC TIME: 0.38 SEC
BH CV ECHO MEAS - MV E MAX VEL: 80.9 CM/SEC
BH CV ECHO MEAS - MV E/A: 1.3
BH CV ECHO MEAS - MV P1/2T MAX VEL: 104 CM/SEC
BH CV ECHO MEAS - MV P1/2T: 72.5 MSEC
BH CV ECHO MEAS - MVA P1/2T LCG: 2.1 CM^2
BH CV ECHO MEAS - MVA(P1/2T): 3 CM^2
BH CV ECHO MEAS - PA ACC SLOPE: 356 CM/SEC^2
BH CV ECHO MEAS - PA ACC TIME: 0.22 SEC
BH CV ECHO MEAS - PA PR(ACCEL): -18.7 MMHG
BH CV ECHO MEAS - RAP SYSTOLE: 3 MMHG
BH CV ECHO MEAS - RVSP: 13 MMHG
BH CV ECHO MEAS - SI(AO): 112.2 ML/M^2
BH CV ECHO MEAS - SI(CUBED): 24.5 ML/M^2
BH CV ECHO MEAS - SI(LVOT): 30.6 ML/M^2
BH CV ECHO MEAS - SI(MOD-SP2): 14.6 ML/M^2
BH CV ECHO MEAS - SI(MOD-SP4): 22.6 ML/M^2
BH CV ECHO MEAS - SI(TEICH): 25.4 ML/M^2
BH CV ECHO MEAS - SV(AO): 168.9 ML
BH CV ECHO MEAS - SV(CUBED): 36.9 ML
BH CV ECHO MEAS - SV(LVOT): 46 ML
BH CV ECHO MEAS - SV(MOD-SP2): 22 ML
BH CV ECHO MEAS - SV(MOD-SP4): 34 ML
BH CV ECHO MEAS - SV(TEICH): 38.3 ML
BH CV ECHO MEAS - TAPSE (>1.6): 2.4 CM
BH CV ECHO MEAS - TR MAX PG: 10 MMHG
BH CV ECHO MEAS - TR MAX VEL: 155 CM/SEC
BH CV ECHO MEASUREMENTS AVERAGE E/E' RATIO: 6.66
BH CV XLRA - RV BASE: 3.4 CM
BH CV XLRA - RV LENGTH: 4.6 CM
BH CV XLRA - RV MID: 2.6 CM
BILIRUB SERPL-MCNC: 0.4 MG/DL (ref 0–1.2)
BNP SERPL-MCNC: 21.2 PG/ML (ref 0–100)
BUN SERPL-MCNC: 6 MG/DL (ref 6–20)
BUN/CREAT SERPL: 10 (ref 7–25)
CALCIUM SERPL-MCNC: 9.4 MG/DL (ref 8.6–10.5)
CHLORIDE SERPL-SCNC: 106 MMOL/L (ref 98–107)
CHOLEST SERPL-MCNC: 208 MG/DL (ref 0–200)
CO2 SERPL-SCNC: 27.3 MMOL/L (ref 22–29)
CREAT SERPL-MCNC: 0.6 MG/DL (ref 0.57–1)
ERYTHROCYTE [DISTWIDTH] IN BLOOD BY AUTOMATED COUNT: 13 % (ref 12.3–15.4)
GLOBULIN SER CALC-MCNC: 2.1 GM/DL
GLUCOSE SERPL-MCNC: 106 MG/DL (ref 65–99)
HCT VFR BLD AUTO: 42.2 % (ref 34–46.6)
HDLC SERPL-MCNC: 33 MG/DL (ref 40–60)
HGB BLD-MCNC: 14.3 G/DL (ref 12–15.9)
LDLC SERPL CALC-MCNC: 157 MG/DL (ref 0–100)
LV EF 2D ECHO EST: 70 %
MCH RBC QN AUTO: 31.5 PG (ref 26.6–33)
MCHC RBC AUTO-ENTMCNC: 33.9 G/DL (ref 31.5–35.7)
MCV RBC AUTO: 93 FL (ref 79–97)
PLATELET # BLD AUTO: 243 10*3/MM3 (ref 140–450)
POTASSIUM SERPL-SCNC: 4.3 MMOL/L (ref 3.5–5.2)
PROT SERPL-MCNC: 6.7 G/DL (ref 6–8.5)
RBC # BLD AUTO: 4.54 10*6/MM3 (ref 3.77–5.28)
SODIUM SERPL-SCNC: 142 MMOL/L (ref 136–145)
TRIGL SERPL-MCNC: 99 MG/DL (ref 0–150)
VLDLC SERPL CALC-MCNC: 18 MG/DL (ref 5–40)
WBC # BLD AUTO: 7.49 10*3/MM3 (ref 3.4–10.8)

## 2021-03-18 ENCOUNTER — BULK ORDERING (OUTPATIENT)
Dept: CASE MANAGEMENT | Facility: OTHER | Age: 51
End: 2021-03-18

## 2021-03-18 DIAGNOSIS — Z23 IMMUNIZATION DUE: ICD-10-CM

## 2021-05-21 ENCOUNTER — TELEPHONE (OUTPATIENT)
Dept: ONCOLOGY | Facility: CLINIC | Age: 51
End: 2021-05-21

## 2021-05-21 NOTE — TELEPHONE ENCOUNTER
Caller: MARIA M    Relationship to patient: PATIENT    Best call back number: [phone]     Type of visit: LAB AND FOLLOW UP     Requested date: NEXT AVAILABLE APPT     Additional notes: PATIENT'S LAST APPT WAS 06/2019. SHE'S IN NEED OF A GENERAL FOLLOW UP

## 2021-05-27 ENCOUNTER — OFFICE VISIT (OUTPATIENT)
Dept: ONCOLOGY | Facility: CLINIC | Age: 51
End: 2021-05-27

## 2021-05-27 VITALS
WEIGHT: 108.6 LBS | HEIGHT: 63 IN | DIASTOLIC BLOOD PRESSURE: 77 MMHG | BODY MASS INDEX: 19.24 KG/M2 | HEART RATE: 101 BPM | TEMPERATURE: 98.4 F | OXYGEN SATURATION: 98 % | SYSTOLIC BLOOD PRESSURE: 149 MMHG | RESPIRATION RATE: 18 BRPM

## 2021-05-27 DIAGNOSIS — I51.9 HEART DISEASE, UNSPECIFIED: ICD-10-CM

## 2021-05-27 DIAGNOSIS — E72.11 HYPERHOMOCYSTEINEMIA (HCC): Primary | ICD-10-CM

## 2021-05-27 PROCEDURE — 83090 ASSAY OF HOMOCYSTEINE: CPT | Performed by: INTERNAL MEDICINE

## 2021-05-27 PROCEDURE — 99214 OFFICE O/P EST MOD 30 MIN: CPT | Performed by: INTERNAL MEDICINE

## 2021-05-27 RX ORDER — LISINOPRIL 10 MG/1
10 TABLET ORAL DAILY
COMMUNITY
End: 2021-06-23 | Stop reason: SDUPTHER

## 2021-05-27 RX ORDER — FUROSEMIDE 40 MG/1
40 TABLET ORAL DAILY
COMMUNITY
End: 2021-06-23

## 2021-05-27 RX ORDER — DOXYCYCLINE HYCLATE 100 MG/1
100 CAPSULE ORAL 2 TIMES DAILY
COMMUNITY
End: 2023-02-10

## 2021-05-27 RX ORDER — PROMETHAZINE HYDROCHLORIDE 12.5 MG/1
25 TABLET ORAL EVERY 8 HOURS PRN
Qty: 90 TABLET | Refills: 1 | Status: SHIPPED | OUTPATIENT
Start: 2021-05-27 | End: 2021-08-26 | Stop reason: SDUPTHER

## 2021-05-27 NOTE — PROGRESS NOTES
Name:  Jania Oconnor  :  1970  Date:  2021     PRIMARY CARE PHYSICIAN  Rissa Mason MD    REASON FOR FOLLOWUP  1. Hyperhomocysteinemia (CMS/HCC)      CHIEF COMPLAINT  Oral thrush and nausea (currently being treated for Lyme disease).    Dear Dr. Mason,    HISTORY OF PRESENT ILLNESS:   I saw Ms. Oconnor in follow up today in our hematology clinic. As you are aware, she is a pleasant, 50 y.o., white female with an extensive family history of heart disease and early-age heart attacks (and resulting deaths) who, in 2012, presented to the [facility] ER with a severe episode of chest pain. She was found to have an acute, anterior, ST-elevation MI. She was med-flighted to [facility], where a cardiac catheterization was performed and she was found to have 95% blockage in her LAD. Stents were placed, and she was discharged on ASA and Plavix. She quit smoking at that time. She has done fairly well from this standpoint since; however, by 2013, she was noted to have a significantly elevated (and rising) homocysteine level. Her previous PCP ordered a hypercoagulable workup, started her on Coumadin and referred her to our clinic for further evaluation. The patient was initially evaluated by Dr. Castaneda on 11/15/2013, and a complete hypercoagulable panel was ordered.  I saw her for the first time in follow up in 2013. Between those visits, recommendations were made to continue lifelong anticoagulation (she has been doing well on Coumadin), B12 and folic acid (the latter two for her hereditary hyperhomocysteinemia, which likely played a large role in her early-age MI) in addition to ASA and Plavix. She was also referred to our genetic counselor.     INTERIM HISTORY:  Ms. Oconnor presents to clinic today by herself (after another two year absence) She has continued to do overall well since we last saw her in clinic. She remains on Coumadin and is still tolerating it well. She  "had another repeat stress test recently, and this was reportedly unremarkable. Otherwise, she has still not had any significant cardiac issues since her initial STEMI in . For the past several weeks, she has been receiving treatment for Lyme disease (she woke up one morning with ticks \"covering\" her left leg). Her current complaint are of a headache for the past six days (which she thinks has been caused by constant nausea) and oral thrush (which she knows has been caused by the antibiotics she is still taking). She otherwise has no new or specific complaints.     Past Medical History:   Diagnosis Date   • Anemia    • Anxiety    • Arthritis    • CHF (congestive heart failure) (CMS/[facility])    • Hyperlipidemia    • Metabolic disorder    • Myocardial infarction (CMS/[facility])    • Panic disorder        Past Surgical History:   Procedure Laterality Date   • CARDIAC CATHETERIZATION  2012   • DILATATION AND CURETTAGE     • HYSTERECTOMY     • SINUS SURGERY     • VASCULAR SURGERY         Social History     Socioeconomic History   • Marital status:      Spouse name: Not on file   • Number of children: Not on file   • Years of education: Not on file   • Highest education level: Not on file   Tobacco Use   • Smoking status: Former Smoker     Packs/day: 0.50     Years: 15.00     Pack years: 7.50     Types: Cigarettes     Quit date: 4/3/2012     Years since quittin.1   • Smokeless tobacco: Never Used   Vaping Use   • Vaping Use: Former   Substance and Sexual Activity   • Alcohol use: No   • Drug use: No   • Sexual activity: Defer       Family History   Problem Relation Age of Onset   • Lung cancer Mother    • Breast cancer Mother 47   • Heart attack Father 45        several MI's   • Heart failure Father    • Heart attack Sister 42   • Heart attack Brother 36   • Heart attack Paternal Uncle 40   • Heart attack Paternal Uncle 45   • Heart attack Paternal Uncle 45   • Heart attack Paternal Uncle 40   • Heart " attack Paternal Uncle 45   • Heart attack Paternal Uncle 45       Allergies   Allergen Reactions   • Atorvastatin GI Intolerance   • Erythromycin GI Intolerance       Current Outpatient Medications   Medication Sig Dispense Refill   • ALPRAZolam (XANAX) 1 MG tablet Take 1 mg by mouth 3 (Three) Times a Day As Needed for Anxiety.     • aspirin 81 MG EC tablet Take 1 tablet by mouth Daily.     • clopidogrel (PLAVIX) 75 MG tablet Take 1 tablet by mouth Daily. 30 tablet    • Cyanocobalamin ([employer]S B-12) 1000 MCG/15ML liquid Take  by mouth Daily.     • doxycycline (VIBRAMYCIN) 100 MG capsule Take 100 mg by mouth 2 (Two) Times a Day.     • furosemide (LASIX) 40 MG tablet Take 40 mg by mouth Daily.     • lisinopril (PRINIVIL,ZESTRIL) 10 MG tablet Take 10 mg by mouth Daily.     • nitroglycerin (NITROSTAT) 0.4 MG SL tablet Place 1 tablet under the tongue Every 5 (Five) Minutes As Needed for Chest Pain. Take no more than 3 doses in 15 minutes. 25 tablet 5   • warfarin (COUMADIN) 10 MG tablet Alternating between 8-10mg     • metoprolol succinate XL (TOPROL-XL) 25 MG 24 hr tablet Take 1 tablet by mouth Daily. 90 tablet 3   • rosuvastatin (CRESTOR) 40 MG tablet Take 1 tablet by mouth Daily. 90 tablet 3     No current facility-administered medications for this visit.     REVIEW OF SYSTEMS  CONSTITUTIONAL:  No fever, chills, night sweats or fatigue.  EYES:  No blurry vision, diplopia or other vision changes.  ENT:  No hearing loss or nosebleeds. Oral thrush, as per the HPI above.  CARDIOVASCULAR:  No palpitations or edema. Intermittent dizzy episodes and bradycardia, as per the HPI above.  PULMONARY:  No hemoptysis, wheezing, chronic cough or shortness of breath.  GASTROINTESTINAL:  No constipation or diarrhea.  No abdominal pain. Nausea, as per the HPI above.  GENITOURINARY:  No hematuria, kidney stones or frequent urination.  MUSCULOSKELETAL:  No joint or back pains.  INTEGUMENTARY: No rashes or pruritus.  ENDOCRINE:  No excessive  "thirst or hot flashes.  HEMATOLOGIC:  No history of free bleeding, spontaneous bleeding or clotting.  IMMUNOLOGIC:  No allergies or frequent infections.  NEUROLOGIC: No numbness, tingling, seizures or weakness. Headaches, as per the HPI above.  PSYCHIATRIC:  No anxiety or depression.    PHYSICAL EXAMINATION  /77   Pulse 101   Temp 98.4 °F (36.9 °C) (Temporal)   Resp 18   Ht 160 cm (63\")   Wt 49.3 kg (108 lb 9.6 oz)   SpO2 98%   BMI 19.24 kg/m²     GENERAL:  A well-developed, well-nourished, thin, white female in no acute distress, slightly ill-appearing, due to nausea/headache.  HEENT:  Pupils equally round and reactive to light.  Extraocular muscles intact.  CARDIOVASCULAR:  Regular rate and rhythm.  No murmurs, gallops or rubs.  LUNGS:  Clear to auscultation bilaterally.  ABDOMEN:  Soft, nontender, nondistended with positive bowel sounds.  EXTREMITIES:  No clubbing, cyanosis or edema bilaterally.  SKIN:  No rashes or petechiae.  NEURO:  Cranial nerves grossly intact.  No focal deficits.  PSYCH:  Alert and oriented x3.    LABORATORY    Lab Results   Component Value Date    WBC 7.49 10/14/2020    HGB 14.3 10/14/2020    HCT 42.2 10/14/2020    MCV 93.0 10/14/2020     10/14/2020    NEUTROABS 4.64 06/06/2019       Lab Results   Component Value Date     10/14/2020    K 4.3 10/14/2020     10/14/2020    CO2 27.3 10/14/2020    BUN 6 10/14/2020    CREATININE 0.60 10/14/2020    GLUCOSE 99 06/06/2019    CALCIUM 9.4 10/14/2020    AST 21 10/14/2020    ALT 23 10/14/2020    ALKPHOS 100 10/14/2020    BILITOT 0.4 10/14/2020    PROTEINTOT 7.0 06/06/2019    ALBUMIN 4.60 10/14/2020     Homocysteine level, serum (06/06/2019): 29.4 umol/L  Homocysteine level, serum (05/11/2017): 24.5 umol/L  Homocysteine level, serum (05/13/2016): 53.2 umol/L    Extensive hypercoagulable workup on 11/15/2013 was negative (full results are available in our portal system) with the following exceptions:        Homocystine " level, serum: > 65.0  UMOL/L (upper limit of normal: 13.9)   MTHFR, DNA analysis: Two copies of the same mutation (C677T and C677T) identified.   Factor II, DNA analysis: Single [id number] mutation identified       IMAGING    PATHOLOGY    IMPRESSION AND PLAN  Ms. Oconnor is a 50 y.o., white female with:  1. Hyperhomocysteinemia/hypercoagulable state: A significantly increased level (> 65 UMOL/L) was confirmed at the time of her hypercoagulable workup in 2013, and the results of the MTHFR DNA analysis provides an explanation for this. She has two copies of the C677T mutation, which has been shown to be associated with elevated homocysteine levels. Most importantly, this elevated homocysteine level is probably playing a major role in her (and, almost certainly, her family's) early-onset heart disease. Though the process is not fully understood, it is believed that elevated homocysteine levels contribute to chronic arterial wall inflammation. This exacerbates the effect of cholesterol, hypertension and other commonly associated risk factors for CAD, ultimately leading to premature plaque buildups and, consequently, earlier onset, premature heart disease and heart attacks. 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 While this is the second most common cause of an inherited thrombophilia, her risk of venous clotting (such as DVTs or PEs) appears to be a relatively minor issue (although at her initial consultation  with us she reported a history of a “lung clot”, a review of her records from April 2012 showed that her cardiac event appeared to be her only diagnosis). This explained why she had never been on Coumadin (but rather, just ASA and Plavix) before she was started on it in October 2013 for her homocysteine levels. All of this being said, her factor II gene mutation is certainly not helping her coronary artery (or venous) thrombotic risk. I have had multiple, long discussions  with the patient regarding these findings and issues (including, again, today). There is, unfortunately, no known way to completely correct her hyperhomocysteinemia (just as there is no “cure” for the presence of the mutated genes that are  leading to it). The Vitamin B and folic acid supplementation she has been on for years now (and remains on) has helped some (by decreasing her homocysteine levels and, in turn, hopefully reducing the associated vessel inflammation); but, as it is, not surprisingly, still elevated (29.4 umol/L as of 06/06/2019, though this is much improved compared to in previous years; today's result is pending), continuing B12 and folic acid, continuing to address her controllable risk factors (ongoing abstinence from smoking, statin dosing, etc.) and continuing ASA, Plavix and full anticoagulation (she continues to tolerate Coumadin overall well) as she has been doing, are all still extremely important (and will continue to provide the maximum intervention/preventative therapy possible). She will continue to primarily follow closely with her cardiologist as well. We will see her back in our clinic in one year.  2. Genetic risk: The patient' s daughter all but certainly inherited one of the MTHFR C677T mutations (which might increase her longer term cardiac risk just as it did her mother's) and has a 50% chance of having her mother's copy of the prothrombin [id number] mutation (which, as mentioned, is the second most common cause of an inherited, mild thrombophilia). Particularly since her daughter is still in her twenties (25 now), neither of these are likely reason enough to place her (the daughter) on antiplatelet drugs or full anticoagulation immediately, but they continue to be something of which they (the patient and her daughter) need to remain aware. The patient's daughter is planning on undergoing a genetic counseling evaluation soon.  3. Nausea: A recently persistent headache has been  causing her to be nauseated, and vice versa. Likely secondary to Lyme disease. A Rx for prn phenergan was provided today, per patient request. Otherwise, ongoing management for her Lyme disease per primary care.  4. Oral thrush: Caused by her ongoing antibiotics that she is on for her recent diagnosis with Lyme disease. A Rx for Nystatin swish and swallow was provided today, per patient request. Otherwise, ongoing management for her Lyme disease per primary care.  The patient was in agreement with these plans.    It is a pleasure to participate in Ms. Oconnor's care. Please do not hesitate to call with any questions or concerns that you may have.    A total of 30 minutes were spent coordinating this patient’s care in clinic today; more than 50% of this time was face-to-face with the patient, reviewing her interim medical history and counseling on the current treatment recommendations and followup plan. All questions were answered to her satisfaction.    FOLLOW UP  Rxs for Nystatin swish and swallow and prn phenergan provided today. Continue ASA, Plavix, Coumadin, B12 and folic acid. With cardiology and her PCP as previously planned. Return to our clinic in 1 year with a repeat homocysteine level.            This document was electronically signed by YURI Perkins MD May 27, 2021 13:04 EDT      CC: MD Reinier Cantu IV, MD

## 2021-05-28 DIAGNOSIS — I51.9 HEART DISEASE, UNSPECIFIED: ICD-10-CM

## 2021-05-28 DIAGNOSIS — E72.11 HYPERHOMOCYSTEINEMIA (HCC): Primary | ICD-10-CM

## 2021-05-28 LAB — HCYS SERPL-MCNC: 82.3 UMOL/L (ref 0–15)

## 2021-05-28 RX ORDER — CYANOCOBALAMIN/FOLIC AC/VIT B6 1-2.5-25MG
1 TABLET ORAL DAILY
Qty: 30 TABLET | Refills: 11 | Status: SHIPPED | OUTPATIENT
Start: 2021-05-28 | End: 2022-05-25 | Stop reason: SDUPTHER

## 2021-06-22 NOTE — PROGRESS NOTES
"[facility] Cardiology      Identification: Jania Oconnor is a 50 y.o. female who resides in [place], [place].    Reason for visit:  Coronary Artery Disease, Hyperlipidemia, and Palpitations      Subjective      Jania Oconnor presents to [facility], [place], operated by [facility] for followup.    Patient returns to the office for routine follow-up.  The patient states that over the last month she has been experiencing new onset of chest discomfort and shortness of breath.  The chest discomfort is a pressure sensation over the precordium.  It is associated with palpitations and skipped beats.  The symptoms tend to occur with exertion, such as carrying groceries into the house.  Chest pain or shortness of breath resolve with rest.  She also has some of the palpitation symptoms at rest.  She has been under a lot of stress over the last month due to a right forearm infection following a cat bite as well as infestation with ticks.  She does have a history of anxiety and panic disorder.  She also complains of episodic lightheadedness.    She has been compliant with her medications.        Review of Systems   All other systems reviewed and are negative.      Objective     /76 (BP Location: Right arm, Patient Position: Sitting)   Pulse 93   Ht 157.5 cm (62\")   Wt 51.1 kg (112 lb 9.6 oz)   SpO2 98%   BMI 20.59 kg/m²       Constitutional:       Appearance: Healthy appearance. Well-developed.   Eyes:      General: No scleral icterus.  HENT:      Head: Normocephalic and atraumatic.   Neck:      Vascular: No carotid bruit or JVD. JVD normal.   Pulmonary:      Effort: Pulmonary effort is normal.      Breath sounds: Normal breath sounds.   Cardiovascular:      Normal rate. Regular rhythm.      Murmurs: There is no murmur.      No gallop.   Musculoskeletal:      Extremities: No clubbing present.Skin:     General: Skin is warm and dry. There is no cyanosis.   Neurological:      Mental Status: Alert.   Psychiatric:         Attention and " Perception: Attention normal.         Behavior: Behavior normal.         Result Review :    Lab Results   Component Value Date     (H) 10/14/2020    BUN 6 10/14/2020    CREATININE 0.60 10/14/2020    EGFRIFNONA 106 10/14/2020    EGFRIFAFRI 129 10/14/2020    BCR 10.0 10/14/2020    K 4.3 10/14/2020    CO2 27.3 10/14/2020    CALCIUM 9.4 10/14/2020    PROTENTOTREF 6.7 10/14/2020    ALBUMIN 4.60 10/14/2020    LABIL2 2.2 10/14/2020    AST 21 10/14/2020    ALT 23 10/14/2020     Lab Results   Component Value Date    WBC 7.49 10/14/2020    HGB 14.3 10/14/2020    HCT 42.2 10/14/2020    MCV 93.0 10/14/2020     10/14/2020     Lab Results   Component Value Date    CHLPL 208 (H) 10/14/2020    TRIG 99 10/14/2020    HDL 33 (L) 10/14/2020     (H) 10/14/2020         ECG 12 Lead    Date/Time: 6/23/2021 10:30 AM  Performed by: Reinier Mccauley IV, MD  Authorized by: Reinier Mccauley IV, MD   Comparison: compared with previous ECG from 2/21/2020  Similar to previous ECG  Rhythm: sinus rhythm  BPM: 86  Other findings: non-specific ST-T wave changes    Clinical impression: non-specific ECG             Assessment     Problem List Items Addressed This Visit        Cardiology Problems    Coronary artery disease involving native coronary artery of native heart with unstable angina pectoris (CMS/HCC) - Primary (Chronic)    Overview     · Cardiac catheterization for anterior MI/out-of-hospital cardiac arrest (04/03/2012): Bare metal stent to LAD.  LVEF 50-55%.  · Pharmacologic nuclear stress test (11/03/2014): Negative for ischemia or scar.  LVEF 66%  · Echo (4/21/2017): Normal LVEF.  Significant valvular abnormality  · Nuclear stress test (03/20/2019): No evidence of ischemia. LVEF 69%.          Current Assessment & Plan     · New onset functional class III angina/heart failure symptoms  · EKG today with nonspecific ST and T wave abnormalities  · Add amlodipine 2.5 mg daily to metoprolol for  angina  · Continue DAPT and statin therapy  · Arrange exercise nuclear stress test  · Low threshold for invasive study given symptoms         Relevant Medications    clopidogrel (PLAVIX) 75 MG tablet    nitroglycerin (NITROSTAT) 0.4 MG SL tablet    aspirin 81 MG EC tablet    metoprolol succinate XL (TOPROL-XL) 25 MG 24 hr tablet    amLODIPine (NORVASC) 2.5 MG tablet    Essential hypertension (Chronic)    Overview     • Target blood pressure <130/80 mmHg         Current Assessment & Plan     · Discontinue furosemide  · Start amlodipine 2.5 mg daily         Relevant Medications    metoprolol succinate XL (TOPROL-XL) 25 MG 24 hr tablet    amLODIPine (NORVASC) 2.5 MG tablet    lisinopril (PRINIVIL,ZESTRIL) 10 MG tablet    Hyperlipidemia LDL goal <70 (Chronic)    Overview     · High intensity statin therapy indicated given presence coronary artery disease         Current Assessment & Plan     · Obtain CMP and lipids today         Relevant Orders    Lipid Panel    Comprehensive Metabolic Panel    Palpitations    Overview     · Zio patch (3/15/2017): Sinus rhythm.  Short episodes of atrial tachycardia.  No significant arrhythmia.  · Echo (02/22/2020): LVEF 60%. No valvular abnormalities.  · Recurrent palpitation symptoms, 6/2021  · EKG (6/23/2021): Sinus rhythm without ectopy         Current Assessment & Plan     · If ischemic evaluation remarkable, will place Holter monitor to assess palpitations            Other    Dyspnea on exertion    Overview     · Echo (10/15/2020): EF 70%.  No significant valve abnormality         Current Assessment & Plan     · Obtain exercise nuclear stress to               Plan   • Discontinue furosemide  • Start amlodipine 2.5 mg daily  • Exercise nuclear stress test  • Follow-up with me after testing  • Low threshold for invasive study given symptoms      Follow-up   After stress test        Howard Mccauley MD, FAC, [facility]  6/23/2021     Scribed for Reinier Mccauley IV, MD by Cassidy  Marshall.  06/23/21   10:28 EDT    I have personally performed the services described in this document as scribed by the above individual, and it is both accurate and complete.  Reinier Mccauley IV, MD  6/23/2021  10:32 EDT

## 2021-06-23 ENCOUNTER — OFFICE VISIT (OUTPATIENT)
Dept: CARDIOLOGY | Facility: CLINIC | Age: 51
End: 2021-06-23

## 2021-06-23 VITALS
BODY MASS INDEX: 20.72 KG/M2 | DIASTOLIC BLOOD PRESSURE: 76 MMHG | WEIGHT: 112.6 LBS | SYSTOLIC BLOOD PRESSURE: 120 MMHG | HEART RATE: 93 BPM | HEIGHT: 62 IN | OXYGEN SATURATION: 98 %

## 2021-06-23 DIAGNOSIS — E78.5 HYPERLIPIDEMIA LDL GOAL <70: ICD-10-CM

## 2021-06-23 DIAGNOSIS — R00.2 PALPITATIONS: ICD-10-CM

## 2021-06-23 DIAGNOSIS — I25.110 CORONARY ARTERY DISEASE INVOLVING NATIVE CORONARY ARTERY OF NATIVE HEART WITH UNSTABLE ANGINA PECTORIS (HCC): ICD-10-CM

## 2021-06-23 DIAGNOSIS — R06.09 DYSPNEA ON EXERTION: ICD-10-CM

## 2021-06-23 DIAGNOSIS — I25.119 CORONARY ARTERY DISEASE INVOLVING NATIVE CORONARY ARTERY OF NATIVE HEART WITH ANGINA PECTORIS (HCC): Primary | Chronic | ICD-10-CM

## 2021-06-23 DIAGNOSIS — I10 ESSENTIAL HYPERTENSION: ICD-10-CM

## 2021-06-23 LAB
ALBUMIN SERPL-MCNC: 4.5 G/DL (ref 3.5–5.2)
ALBUMIN/GLOB SERPL: 2.6 G/DL
ALP SERPL-CCNC: 77 U/L (ref 39–117)
ALT SERPL-CCNC: 34 U/L (ref 1–33)
AST SERPL-CCNC: 20 U/L (ref 1–32)
BILIRUB SERPL-MCNC: 0.4 MG/DL (ref 0–1.2)
BUN SERPL-MCNC: 7 MG/DL (ref 6–20)
BUN/CREAT SERPL: 10 (ref 7–25)
CALCIUM SERPL-MCNC: 9.8 MG/DL (ref 8.6–10.5)
CHLORIDE SERPL-SCNC: 106 MMOL/L (ref 98–107)
CHOLEST SERPL-MCNC: 229 MG/DL (ref 0–200)
CO2 SERPL-SCNC: 28.5 MMOL/L (ref 22–29)
CREAT SERPL-MCNC: 0.7 MG/DL (ref 0.57–1)
ERYTHROCYTE [DISTWIDTH] IN BLOOD BY AUTOMATED COUNT: 12.5 % (ref 12.3–15.4)
GLOBULIN SER CALC-MCNC: 1.7 GM/DL
GLUCOSE SERPL-MCNC: 88 MG/DL (ref 65–99)
HCT VFR BLD AUTO: 42.1 % (ref 34–46.6)
HDLC SERPL-MCNC: 40 MG/DL (ref 40–60)
HGB BLD-MCNC: 14.7 G/DL (ref 12–15.9)
LDLC SERPL CALC-MCNC: 152 MG/DL (ref 0–100)
MCH RBC QN AUTO: 33 PG (ref 26.6–33)
MCHC RBC AUTO-ENTMCNC: 34.9 G/DL (ref 31.5–35.7)
MCV RBC AUTO: 94.6 FL (ref 79–97)
PLATELET # BLD AUTO: 216 10*3/MM3 (ref 140–450)
POTASSIUM SERPL-SCNC: 4.2 MMOL/L (ref 3.5–5.2)
PROT SERPL-MCNC: 6.2 G/DL (ref 6–8.5)
RBC # BLD AUTO: 4.45 10*6/MM3 (ref 3.77–5.28)
SODIUM SERPL-SCNC: 142 MMOL/L (ref 136–145)
TRIGL SERPL-MCNC: 202 MG/DL (ref 0–150)
VLDLC SERPL CALC-MCNC: 37 MG/DL (ref 5–40)
WBC # BLD AUTO: 5.86 10*3/MM3 (ref 3.4–10.8)

## 2021-06-23 PROCEDURE — 93000 ELECTROCARDIOGRAM COMPLETE: CPT | Performed by: INTERNAL MEDICINE

## 2021-06-23 PROCEDURE — 99214 OFFICE O/P EST MOD 30 MIN: CPT | Performed by: INTERNAL MEDICINE

## 2021-06-23 RX ORDER — AMLODIPINE BESYLATE 2.5 MG/1
2.5 TABLET ORAL DAILY
Qty: 90 TABLET | Refills: 1 | Status: SHIPPED | OUTPATIENT
Start: 2021-06-23

## 2021-06-23 RX ORDER — LISINOPRIL 10 MG/1
10 TABLET ORAL DAILY
Start: 2021-06-23

## 2021-06-23 RX ORDER — NITROGLYCERIN 0.4 MG/1
0.4 TABLET SUBLINGUAL
Qty: 25 TABLET | Refills: 5 | Status: SHIPPED | OUTPATIENT
Start: 2021-06-23

## 2021-06-23 RX ORDER — METOPROLOL SUCCINATE 25 MG/1
25 TABLET, EXTENDED RELEASE ORAL DAILY
Qty: 90 TABLET | Refills: 3 | Status: SHIPPED | OUTPATIENT
Start: 2021-06-23

## 2021-06-23 RX ORDER — ASPIRIN 81 MG/1
81 TABLET ORAL DAILY
Start: 2021-06-23

## 2021-06-23 RX ORDER — CLOPIDOGREL BISULFATE 75 MG/1
75 TABLET ORAL DAILY
Qty: 90 TABLET | Refills: 3
Start: 2021-06-23

## 2021-06-29 ENCOUNTER — TELEPHONE (OUTPATIENT)
Dept: CARDIOLOGY | Facility: CLINIC | Age: 51
End: 2021-06-29

## 2021-06-29 NOTE — TELEPHONE ENCOUNTER
Left message. She is already taking Crestor 40 mg. Told her to call back to let us know if she was taking it daily.

## 2021-06-29 NOTE — TELEPHONE ENCOUNTER
----- Message from Reinier Mccauley IV, MD sent at 6/25/2021  4:29 PM EDT -----  Cholesterol is uncontrolled.  Need to start rosuvastatin 20 mg nightly.

## 2021-07-07 ENCOUNTER — TELEPHONE (OUTPATIENT)
Dept: CARDIOLOGY | Facility: CLINIC | Age: 51
End: 2021-07-07

## 2021-07-07 NOTE — TELEPHONE ENCOUNTER
Patient returned my call for last week and reports she was currently taking pravastatin. I called her back and left a message that HTR would prefer that she take Crestor. Script sent previously. Told to call with any questions.

## 2021-08-26 ENCOUNTER — OFFICE VISIT (OUTPATIENT)
Dept: ONCOLOGY | Facility: CLINIC | Age: 51
End: 2021-08-26

## 2021-08-26 ENCOUNTER — LAB (OUTPATIENT)
Dept: ONCOLOGY | Facility: CLINIC | Age: 51
End: 2021-08-26

## 2021-08-26 VITALS
BODY MASS INDEX: 19.49 KG/M2 | SYSTOLIC BLOOD PRESSURE: 122 MMHG | DIASTOLIC BLOOD PRESSURE: 76 MMHG | TEMPERATURE: 98.2 F | HEART RATE: 92 BPM | RESPIRATION RATE: 18 BRPM | HEIGHT: 63 IN | OXYGEN SATURATION: 99 % | WEIGHT: 110 LBS

## 2021-08-26 DIAGNOSIS — I70.91 GENERALIZED ATHEROSCLEROSIS: ICD-10-CM

## 2021-08-26 DIAGNOSIS — E72.11 HYPERHOMOCYSTEINEMIA (HCC): ICD-10-CM

## 2021-08-26 DIAGNOSIS — E72.11 HYPERHOMOCYSTEINEMIA (HCC): Primary | ICD-10-CM

## 2021-08-26 DIAGNOSIS — I51.9 HEART DISEASE, UNSPECIFIED: ICD-10-CM

## 2021-08-26 PROCEDURE — 83090 ASSAY OF HOMOCYSTEINE: CPT | Performed by: INTERNAL MEDICINE

## 2021-08-26 PROCEDURE — 99214 OFFICE O/P EST MOD 30 MIN: CPT | Performed by: INTERNAL MEDICINE

## 2021-08-26 RX ORDER — PROMETHAZINE HYDROCHLORIDE 12.5 MG/1
25 TABLET ORAL EVERY 8 HOURS PRN
Qty: 90 TABLET | Refills: 3 | Status: SHIPPED | OUTPATIENT
Start: 2021-08-26 | End: 2021-11-30

## 2021-08-26 NOTE — PROGRESS NOTES
Name:  Jania Oconnor  :  1970  Date:  2021     PRIMARY CARE PHYSICIAN  Rissa Mason MD    REASON FOR FOLLOWUP  1. Hyperhomocysteinemia (CMS/HCC)    2. Generalized atherosclerosis       CHIEF COMPLAINT  Chronic anxiety and nausea, recently stable.    Dear Dr. Mason,    HISTORY OF PRESENT ILLNESS:   I saw Ms. Oconnor in follow up today in our hematology clinic. As you are aware, she is a pleasant, 50 y.o., white female with an extensive family history of heart disease and early-age heart attacks (and resulting deaths) who, in 2012, presented to the [facility] ER with a severe episode of chest pain. She was found to have an acute, anterior, ST-elevation MI. She was med-flighted to [facility], where a cardiac catheterization was performed and she was found to have 95% blockage in her LAD. Stents were placed, and she was discharged on ASA and Plavix. She quit smoking at that time. She has done fairly well from this standpoint since; however, by 2013, she was noted to have a significantly elevated (and rising) homocysteine level. Her previous PCP ordered a hypercoagulable workup, started her on Coumadin and referred her to our clinic for further evaluation. The patient was initially evaluated by Dr. Castaneda on 11/15/2013, and a complete hypercoagulable panel was ordered.  I saw her for the first time in follow up in 2013. Between those visits, recommendations were made to continue lifelong anticoagulation (she has been doing well on Coumadin), B12 and folic acid (the latter two for her hereditary hyperhomocysteinemia, which likely played a large role in her early-age MI) in addition to ASA and Plavix. She was also referred to our genetic counselor.     INTERIM HISTORY:  Ms. Oocnnor returns to clinic today for follow up by herself. She remains on Coumadin and is still tolerating it well (although she has been having some trouble recently keeping her INRs in the  "therapeutic range). She is scheduled to undergo another stress test shortly. She may have potentially suffered a mild heart attack earlier this summer; but, otherwise, she has still not had any significant cardiac issues since her initial STEMI in . In May 2021, she had to receive treatment for Lyme disease (she woke up one morning with ticks \"covering\" her left leg). The headache and thrush she was experiencing on this treatment have now resolved. Phenergan continues to be the only medicine that helps her eat (as the smell of food has chronically made her nauseous). She otherwise has no new or specific complaints.    Past Medical History:   Diagnosis Date   • Anemia    • Anxiety    • Arthritis    • CHF (congestive heart failure) (CMS/[facility])    • Hyperlipidemia    • Lyme disease    • Metabolic disorder    • Myocardial infarction (CMS/[facility])    • Panic disorder        Past Surgical History:   Procedure Laterality Date   • CARDIAC CATHETERIZATION  2012   • DILATATION AND CURETTAGE     • HYSTERECTOMY     • SINUS SURGERY     • VASCULAR SURGERY         Social History     Socioeconomic History   • Marital status:      Spouse name: Not on file   • Number of children: Not on file   • Years of education: Not on file   • Highest education level: Not on file   Tobacco Use   • Smoking status: Former Smoker     Packs/day: 0.50     Years: 15.00     Pack years: 7.50     Types: Cigarettes     Quit date: 4/3/2012     Years since quittin.4   • Smokeless tobacco: Never Used   Vaping Use   • Vaping Use: Former   Substance and Sexual Activity   • Alcohol use: No   • Drug use: No   • Sexual activity: Defer       Family History   Problem Relation Age of Onset   • Lung cancer Mother    • Breast cancer Mother 47   • Heart attack Father 45        several MI's   • Heart failure Father    • Heart attack Sister 42   • Heart attack Brother 36   • Heart attack Paternal Uncle 40   • Heart attack Paternal Uncle 45   • Heart " attack Paternal Uncle 45   • Heart attack Paternal Uncle 40   • Heart attack Paternal Uncle 45   • Heart attack Paternal Uncle 45       Allergies   Allergen Reactions   • Atorvastatin GI Intolerance   • Erythromycin GI Intolerance       Current Outpatient Medications   Medication Sig Dispense Refill   • ALPRAZolam (XANAX) 1 MG tablet Take 1 mg by mouth 3 (Three) Times a Day As Needed for Anxiety.     • amLODIPine (NORVASC) 2.5 MG tablet Take 1 tablet by mouth Daily. 90 tablet 1   • aspirin 81 MG EC tablet Take 1 tablet by mouth Daily.     • clopidogrel (PLAVIX) 75 MG tablet Take 1 tablet by mouth Daily. 90 tablet 3   • doxycycline (VIBRAMYCIN) 100 MG capsule Take 100 mg by mouth 2 (Two) Times a Day.     • folic acid-vit B6-vit B12 (Folbee) 2.5-25-1 MG tablet tablet Take 1 tablet by mouth Daily. 30 tablet 11   • lisinopril (PRINIVIL,ZESTRIL) 10 MG tablet Take 1 tablet by mouth Daily.     • metoprolol succinate XL (TOPROL-XL) 25 MG 24 hr tablet Take 1 tablet by mouth Daily. 90 tablet 3   • mupirocin (BACTROBAN) 2 % ointment As Needed.     • nitroglycerin (NITROSTAT) 0.4 MG SL tablet Place 1 tablet under the tongue Every 5 (Five) Minutes As Needed for Chest Pain. Take no more than 3 doses in 15 minutes. 25 tablet 5   • nystatin (MYCOSTATIN) 539145 UNIT/ML suspension Swish and swallow 5 mL 4 (Four) Times a Day. 473 mL 1   • promethazine (PHENERGAN) 12.5 MG tablet Take 2 tablets by mouth Every 8 (Eight) Hours As Needed for Nausea or Vomiting. 90 tablet 3   • rosuvastatin (CRESTOR) 40 MG tablet Take 1 tablet by mouth Daily. 90 tablet 3   • warfarin (COUMADIN) 10 MG tablet Alternating between 8-10mg       No current facility-administered medications for this visit.     REVIEW OF SYSTEMS  CONSTITUTIONAL:  No fever, chills, night sweats or fatigue.  EYES:  No blurry vision, diplopia or other vision changes.  ENT:  No hearing loss or nosebleeds. Oral thrush, as per the HPI above.  CARDIOVASCULAR:  No palpitations or edema.  "Intermittent dizzy episodes and bradycardia, as per the HPI above.  PULMONARY:  No hemoptysis, wheezing, chronic cough or shortness of breath.  GASTROINTESTINAL:  No constipation or diarrhea.  No abdominal pain. Chronic nausea and decreased appetite, as per the HPI above.  GENITOURINARY:  No hematuria, kidney stones or frequent urination.  MUSCULOSKELETAL:  No joint or back pains.  INTEGUMENTARY: No rashes or pruritus.  ENDOCRINE:  No excessive thirst or hot flashes.  HEMATOLOGIC:  No history of free bleeding, spontaneous bleeding or clotting.  IMMUNOLOGIC:  No allergies or frequent infections.  NEUROLOGIC: No numbness, tingling, seizures or weakness. Headaches, as per the HPI above.  PSYCHIATRIC:  No anxiety or depression.    PHYSICAL EXAMINATION  /76   Pulse 92   Temp 98.2 °F (36.8 °C) (Temporal)   Resp 18   Ht 160 cm (63\")   Wt 49.9 kg (110 lb)   SpO2 99%   BMI 19.49 kg/m²     GENERAL:  A well-developed, well-nourished, thin, white female in no acute distress.  HEENT:  Pupils equally round and reactive to light.  Extraocular muscles intact.  CARDIOVASCULAR:  Regular rate and rhythm.  No murmurs, gallops or rubs.  LUNGS:  Clear to auscultation bilaterally.  ABDOMEN:  Soft, nontender, nondistended with positive bowel sounds.  EXTREMITIES:  No clubbing, cyanosis or edema bilaterally.  SKIN:  No rashes or petechiae.  NEURO:  Cranial nerves grossly intact.  No focal deficits.  PSYCH:  Alert and oriented x3.    LABORATORY    Lab Results   Component Value Date    WBC 5.86 06/23/2021    HGB 14.7 06/23/2021    HCT 42.1 06/23/2021    MCV 94.6 06/23/2021     06/23/2021    NEUTROABS 4.64 06/06/2019       Lab Results   Component Value Date     06/23/2021    K 4.2 06/23/2021     06/23/2021    CO2 28.5 06/23/2021    BUN 7 06/23/2021    CREATININE 0.70 06/23/2021    GLUCOSE 99 06/06/2019    CALCIUM 9.8 06/23/2021    AST 20 06/23/2021    ALT 34 (H) 06/23/2021    ALKPHOS 77 06/23/2021    BILITOT 0.4 " 06/23/2021    PROTEINTOT 7.0 06/06/2019    ALBUMIN 4.50 06/23/2021     Homocysteine level, serum (05/27/2021): 82.3 umol/L  Homocysteine level, serum (06/06/2019): 29.4 umol/L  Homocysteine level, serum (05/11/2017): 24.5 umol/L  Homocysteine level, serum (05/13/2016): 53.2 umol/L    Extensive hypercoagulable workup on 11/15/2013 was negative (full results are available in our portal system) with the following exceptions:        Homocystine level, serum: > 65.0  UMOL/L (upper limit of normal: 13.9)   MTHFR, DNA analysis: Two copies of the same mutation (C677T and C677T) identified.   Factor II, DNA analysis: Single [id number] mutation identified       IMAGING    PATHOLOGY    IMPRESSION AND PLAN  Ms. Oconnor is a 50 y.o., white female with:  1. Hyperhomocysteinemia/hypercoagulable state: A significantly increased level (> 65 UMOL/L) was confirmed at the time of her hypercoagulable workup in 2013, and the results of the MTHFR DNA analysis provides an explanation for this. She has two copies of the C677T mutation, which has been shown to be associated with elevated homocysteine levels. Most importantly, this elevated homocysteine level is probably playing a major role in her (and, almost certainly, her family's) early-onset heart disease. Though the process is not fully understood, it is believed that elevated homocysteine levels contribute to chronic arterial wall inflammation. This exacerbates the effect of cholesterol, hypertension and other commonly associated risk factors for CAD, ultimately leading to premature plaque buildups and, consequently, earlier onset, premature heart disease and heart attacks. 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 While this is the second most common cause of an inherited thrombophilia, her risk of venous clotting (such as DVTs or PEs) appears to be a relatively minor issue (although at her initial consultation  with us she reported a history  of a “lung clot”, a review of her records from April 2012 showed that her cardiac event appeared to be her only diagnosis). This explained why she had never been on Coumadin (but rather, just ASA and Plavix) before she was started on it in October 2013 for her homocysteine levels. All of this being said, her factor II gene mutation is certainly not helping her coronary artery (or venous) thrombotic risk. I have had multiple, long discussions with the patient regarding these findings and issues (including, again, today). There is, unfortunately, no known way to completely correct her hyperhomocysteinemia (just as there is no “cure” for the presence of the mutated genes that are  leading to it). Her most recent repeat serum homocysteine level (performed on 05/27/2021) had significantly reincreased (to ~82 umol/L). She was started on a different Vitamin B/folate supplementation at that time, and she has been consistently taking it since then. Even though decreasing her homocysteine level by as much as possible helps some (by hopefully reducing the associated vessel inflammation), continuing to address her more readily controllable risk factors (ongoing abstinence from smoking, statin dosing, etc.) and continuing ASA, Plavix and full anticoagulation (she continues to tolerate Coumadin overall well) as she has been doing, are all critical in mitigating her high risk of cardiovascular events as much as possible. She will continue to follow closely with her cardiologist as well (who has apparently scheduled a repeat stress test shortly, with a possible repeat [facility] to follow). We will see her back in our clinic in three months with repeat labs.  2. Genetic risk: The patient' s daughter all but certainly inherited one of the MTHFR C677T mutations (which might increase her longer term cardiac risk just as it did her mother's) and has a 50% chance of having her mother's copy of the prothrombin [id number] mutation (which, as  mentioned, is the second most common cause of an inherited, mild thrombophilia). Particularly since her daughter is still in her twenties (25 now), neither of these are likely reason enough to place her (the daughter) on antiplatelet drugs or full anticoagulation immediately, but they continue to be something of which they (the patient and her daughter) need to remain aware. The patient's daughter is currently undergoing a genetic counseling evaluation.  3. Nausea: A chronic issue related to her general intolerance of the smell of most foods. Phenergan is reportedly the only medicine that helps her overcome this symptom enough to be able to eat. Refills provided today.  The patient was in agreement with these plans.    It is a pleasure to participate in Ms. Oconnor's care. Please do not hesitate to call with any questions or concerns that you may have.    A total of 30 minutes were spent coordinating this patient’s care in clinic today; more than 50% of this time was face-to-face with the patient, reviewing her interim medical history, discussing the results of recent labwork and counseling on the current treatment and followup plan. All questions were answered to her satisfaction.    FOLLOW UP  Refills of prn phenergan provided today. Continue ASA, Plavix, Coumadin, B12 and folic acid. With cardiology and her PCP as previously planned. Return to our clinic in 3 months with a repeat homocysteine level.            This document was electronically signed by YURI Perkins MD August 26, 2021 14:38 EDT      CC: MD Reinier Cantu IV, MD

## 2021-08-27 LAB — HCYS SERPL-MCNC: 12.4 UMOL/L (ref 0–15)

## 2021-08-31 ENCOUNTER — TELEPHONE (OUTPATIENT)
Dept: ONCOLOGY | Facility: CLINIC | Age: 51
End: 2021-08-31

## 2021-08-31 NOTE — TELEPHONE ENCOUNTER
----- Message from YURI Perkins MD sent at 8/31/2021 10:22 AM EDT -----  Regarding: homocysteine level  Please let her know that last week's repeat level was much much better (~12). She should keep doing what she is doing (taking her current B12/folic acid supplement), and we'll see her back in clinic as planned. Thanks.

## 2021-11-30 ENCOUNTER — LAB (OUTPATIENT)
Dept: ONCOLOGY | Facility: CLINIC | Age: 51
End: 2021-11-30

## 2021-11-30 ENCOUNTER — OFFICE VISIT (OUTPATIENT)
Dept: ONCOLOGY | Facility: CLINIC | Age: 51
End: 2021-11-30

## 2021-11-30 ENCOUNTER — TELEPHONE (OUTPATIENT)
Dept: ONCOLOGY | Facility: CLINIC | Age: 51
End: 2021-11-30

## 2021-11-30 VITALS
RESPIRATION RATE: 18 BRPM | TEMPERATURE: 98 F | HEART RATE: 92 BPM | OXYGEN SATURATION: 99 % | DIASTOLIC BLOOD PRESSURE: 76 MMHG | WEIGHT: 117.8 LBS | SYSTOLIC BLOOD PRESSURE: 137 MMHG | BODY MASS INDEX: 21.68 KG/M2 | HEIGHT: 62 IN

## 2021-11-30 DIAGNOSIS — I70.91 GENERALIZED ATHEROSCLEROSIS: ICD-10-CM

## 2021-11-30 DIAGNOSIS — I25.110 CORONARY ARTERY DISEASE INVOLVING NATIVE CORONARY ARTERY OF NATIVE HEART WITH UNSTABLE ANGINA PECTORIS (HCC): Primary | Chronic | ICD-10-CM

## 2021-11-30 DIAGNOSIS — E72.11 HYPERHOMOCYSTEINEMIA (HCC): ICD-10-CM

## 2021-11-30 LAB
ALBUMIN SERPL-MCNC: 4.18 G/DL (ref 3.5–5.2)
ALBUMIN/GLOB SERPL: 1.5 G/DL
ALP SERPL-CCNC: 81 U/L (ref 39–117)
ALT SERPL W P-5'-P-CCNC: 15 U/L (ref 1–33)
ANION GAP SERPL CALCULATED.3IONS-SCNC: 10.7 MMOL/L (ref 5–15)
AST SERPL-CCNC: 17 U/L (ref 1–32)
BASOPHILS # BLD AUTO: 0.05 10*3/MM3 (ref 0–0.2)
BASOPHILS NFR BLD AUTO: 0.9 % (ref 0–1.5)
BILIRUB SERPL-MCNC: 0.3 MG/DL (ref 0–1.2)
BUN SERPL-MCNC: 7 MG/DL (ref 6–20)
BUN/CREAT SERPL: 10.8 (ref 7–25)
CALCIUM SPEC-SCNC: 9.5 MG/DL (ref 8.6–10.5)
CHLORIDE SERPL-SCNC: 106 MMOL/L (ref 98–107)
CO2 SERPL-SCNC: 23.3 MMOL/L (ref 22–29)
CREAT SERPL-MCNC: 0.65 MG/DL (ref 0.57–1)
DEPRECATED RDW RBC AUTO: 46.2 FL (ref 37–54)
EOSINOPHIL # BLD AUTO: 0.27 10*3/MM3 (ref 0–0.4)
EOSINOPHIL NFR BLD AUTO: 4.6 % (ref 0.3–6.2)
ERYTHROCYTE [DISTWIDTH] IN BLOOD BY AUTOMATED COUNT: 13.5 % (ref 12.3–15.4)
GFR SERPL CREATININE-BSD FRML MDRD: 96 ML/MIN/1.73
GLOBULIN UR ELPH-MCNC: 2.7 GM/DL
GLUCOSE SERPL-MCNC: 121 MG/DL (ref 65–99)
HCT VFR BLD AUTO: 42.4 % (ref 34–46.6)
HGB BLD-MCNC: 13.6 G/DL (ref 12–15.9)
IMM GRANULOCYTES # BLD AUTO: 0.01 10*3/MM3 (ref 0–0.05)
IMM GRANULOCYTES NFR BLD AUTO: 0.2 % (ref 0–0.5)
INR PPP: 0.94 (ref 0.9–1.1)
LYMPHOCYTES # BLD AUTO: 2.1 10*3/MM3 (ref 0.7–3.1)
LYMPHOCYTES NFR BLD AUTO: 36 % (ref 19.6–45.3)
MCH RBC QN AUTO: 30.2 PG (ref 26.6–33)
MCHC RBC AUTO-ENTMCNC: 32.1 G/DL (ref 31.5–35.7)
MCV RBC AUTO: 94 FL (ref 79–97)
MONOCYTES # BLD AUTO: 0.38 10*3/MM3 (ref 0.1–0.9)
MONOCYTES NFR BLD AUTO: 6.5 % (ref 5–12)
NEUTROPHILS NFR BLD AUTO: 3.03 10*3/MM3 (ref 1.7–7)
NEUTROPHILS NFR BLD AUTO: 51.8 % (ref 42.7–76)
NRBC BLD AUTO-RTO: 0 /100 WBC (ref 0–0.2)
PLATELET # BLD AUTO: 225 10*3/MM3 (ref 140–450)
PMV BLD AUTO: 10.7 FL (ref 6–12)
POTASSIUM SERPL-SCNC: 4 MMOL/L (ref 3.5–5.2)
PROT SERPL-MCNC: 6.9 G/DL (ref 6–8.5)
PROTHROMBIN TIME: 12.9 SECONDS (ref 12.8–14.5)
RBC # BLD AUTO: 4.51 10*6/MM3 (ref 3.77–5.28)
SODIUM SERPL-SCNC: 140 MMOL/L (ref 136–145)
WBC NRBC COR # BLD: 5.84 10*3/MM3 (ref 3.4–10.8)

## 2021-11-30 PROCEDURE — 80053 COMPREHEN METABOLIC PANEL: CPT | Performed by: INTERNAL MEDICINE

## 2021-11-30 PROCEDURE — 99214 OFFICE O/P EST MOD 30 MIN: CPT | Performed by: INTERNAL MEDICINE

## 2021-11-30 PROCEDURE — 85025 COMPLETE CBC W/AUTO DIFF WBC: CPT | Performed by: INTERNAL MEDICINE

## 2021-11-30 PROCEDURE — 83090 ASSAY OF HOMOCYSTEINE: CPT | Performed by: INTERNAL MEDICINE

## 2021-11-30 PROCEDURE — 85610 PROTHROMBIN TIME: CPT | Performed by: INTERNAL MEDICINE

## 2021-11-30 RX ORDER — PROMETHAZINE HYDROCHLORIDE 25 MG/1
25 TABLET ORAL EVERY 8 HOURS PRN
Qty: 90 TABLET | Refills: 11 | Status: SHIPPED | OUTPATIENT
Start: 2021-11-30 | End: 2021-12-17 | Stop reason: SDUPTHER

## 2021-11-30 NOTE — PROGRESS NOTES
Name:  Jania Oconnor  :  1970  Date:  2021     PRIMARY CARE PHYSICIAN  Rissa Mason MD    REASON FOR FOLLOWUP  1. Coronary artery disease involving native coronary artery of native heart with unstable angina pectoris (HCC)    2. Generalized atherosclerosis       CHIEF COMPLAINT  Chronic anxiety and nausea, recently stable.    Dear Dr. Mason,    HISTORY OF PRESENT ILLNESS:   I saw Ms. Oconnor in follow up today in our hematology clinic. As you are aware, she is a pleasant, 51 y.o., white female with an extensive family history of heart disease and early-age heart attacks (and resulting deaths) who, in 2012, presented to the [facility] ER with a severe episode of chest pain. She was found to have an acute, anterior, ST-elevation MI. She was med-flighted to [facility], where a cardiac catheterization was performed and she was found to have 95% blockage in her LAD. Stents were placed, and she was discharged on ASA and Plavix. She quit smoking at that time. She has done fairly well from this standpoint since; however, by 2013, she was noted to have a significantly elevated (and rising) homocysteine level. Her previous PCP ordered a hypercoagulable workup, started her on Coumadin and referred her to our clinic for further evaluation. The patient was initially evaluated by Dr. Castaneda on 11/15/2013, and a complete hypercoagulable panel was ordered.  I saw her for the first time in follow up in 2013. Between those visits, recommendations were made to continue lifelong anticoagulation (she has been doing well on Coumadin), B12 and folic acid (the latter two for her hereditary hyperhomocysteinemia, which likely played a large role in her early-age MI) in addition to ASA and Plavix. She was also referred to our genetic counselor.     INTERIM HISTORY:  Ms. Oconnor returns to clinic today for follow up by herself. She remains on Coumadin and is still tolerating it well (for  "the most part, she checks her levels and adjusts her daily dosage on her own). She is scheduled to undergo another stress test shortly (it has been delayed due to COVID). She may have potentially suffered a mild heart attack in early Summer 2021; but, otherwise, she has still not had any significant cardiac issues since her initial STEMI in . In May 2021, she had to receive treatment for Lyme disease (she woke up one morning with ticks \"covering\" her left leg). The headache and thrush she was experiencing on this treatment remain resolved. Phenergan continues to be the only medicine that helps her eat (as the smell of food has chronically made her nauseous). She continues to take B-vitamin and folic acid supplements. She again has no new or other specific complaints.    Past Medical History:   Diagnosis Date   • Anemia    • Anxiety    • Arthritis    • CHF (congestive heart failure) (HCC)    • Hyperlipidemia    • Lyme disease    • Metabolic disorder    • Myocardial infarction (HCC)    • Panic disorder        Past Surgical History:   Procedure Laterality Date   • CARDIAC CATHETERIZATION  2012   • DILATATION AND CURETTAGE     • HYSTERECTOMY     • SINUS SURGERY     • VASCULAR SURGERY         Social History     Socioeconomic History   • Marital status:    Tobacco Use   • Smoking status: Former Smoker     Packs/day: 0.50     Years: 15.00     Pack years: 7.50     Types: Cigarettes     Quit date: 4/3/2012     Years since quittin.6   • Smokeless tobacco: Never Used   Vaping Use   • Vaping Use: Former   Substance and Sexual Activity   • Alcohol use: No   • Drug use: No   • Sexual activity: Defer       Family History   Problem Relation Age of Onset   • Lung cancer Mother    • Breast cancer Mother 47   • Heart attack Father 45        several MI's   • Heart failure Father    • Heart attack Sister 42   • Heart attack Brother 36   • Heart attack Paternal Uncle 40   • Heart attack Paternal Uncle 45   • " Heart attack Paternal Uncle 45   • Heart attack Paternal Uncle 40   • Heart attack Paternal Uncle 45   • Heart attack Paternal Uncle 45       Allergies   Allergen Reactions   • Atorvastatin GI Intolerance   • Erythromycin GI Intolerance       Current Outpatient Medications   Medication Sig Dispense Refill   • ALPRAZolam (XANAX) 1 MG tablet Take 1 mg by mouth 3 (Three) Times a Day As Needed for Anxiety.     • amLODIPine (NORVASC) 2.5 MG tablet Take 1 tablet by mouth Daily. 90 tablet 1   • aspirin 81 MG EC tablet Take 1 tablet by mouth Daily.     • clopidogrel (PLAVIX) 75 MG tablet Take 1 tablet by mouth Daily. 90 tablet 3   • doxycycline (VIBRAMYCIN) 100 MG capsule Take 100 mg by mouth 2 (Two) Times a Day.     • folic acid-vit B6-vit B12 (Folbee) 2.5-25-1 MG tablet tablet Take 1 tablet by mouth Daily. 30 tablet 11   • lisinopril (PRINIVIL,ZESTRIL) 10 MG tablet Take 1 tablet by mouth Daily.     • metoprolol succinate XL (TOPROL-XL) 25 MG 24 hr tablet Take 1 tablet by mouth Daily. 90 tablet 3   • mupirocin (BACTROBAN) 2 % ointment As Needed.     • nitroglycerin (NITROSTAT) 0.4 MG SL tablet Place 1 tablet under the tongue Every 5 (Five) Minutes As Needed for Chest Pain. Take no more than 3 doses in 15 minutes. 25 tablet 5   • nystatin (MYCOSTATIN) 924446 UNIT/ML suspension Swish and swallow 5 mL 4 (Four) Times a Day. 473 mL 1   • promethazine (PHENERGAN) 25 MG tablet Take 1 tablet by mouth Every 8 (Eight) Hours As Needed for Nausea or Vomiting. 90 tablet 11   • rosuvastatin (CRESTOR) 40 MG tablet Take 1 tablet by mouth Daily. 90 tablet 3   • warfarin (COUMADIN) 10 MG tablet Alternating between 8-10mg       No current facility-administered medications for this visit.     REVIEW OF SYSTEMS  CONSTITUTIONAL:  No fever, chills, night sweats or fatigue.  EYES:  No blurry vision, diplopia or other vision changes.  ENT:  No hearing loss or nosebleeds. Oral thrush, as per the HPI above.  CARDIOVASCULAR:  No palpitations or  "edema. Intermittent dizzy episodes and bradycardia, as per the HPI above.  PULMONARY:  No hemoptysis, wheezing, chronic cough or shortness of breath.  GASTROINTESTINAL:  No constipation or diarrhea.  No abdominal pain. Chronic nausea and decreased appetite, as per the HPI above.  GENITOURINARY:  No hematuria, kidney stones or frequent urination.  MUSCULOSKELETAL:  No joint or back pains.  INTEGUMENTARY: No rashes or pruritus.  ENDOCRINE:  No excessive thirst or hot flashes.  HEMATOLOGIC:  No history of free bleeding, spontaneous bleeding or clotting.  IMMUNOLOGIC:  No allergies or frequent infections.  NEUROLOGIC: No numbness, tingling, seizures or weakness. Headaches, as per the HPI above.  PSYCHIATRIC:  No anxiety or depression.    PHYSICAL EXAMINATION  /76   Pulse 92   Temp 98 °F (36.7 °C) (Temporal)   Resp 18   Ht 157.5 cm (62\")   Wt 53.4 kg (117 lb 12.8 oz)   SpO2 99%   BMI 21.55 kg/m²     ECO  GENERAL:  A well-developed, well-nourished, thin, white female in no acute distress.  HEENT:  Pupils equally round and reactive to light. Extraocular muscles intact.  CARDIOVASCULAR:  Regular rate and rhythm. No murmurs, gallops or rubs.  LUNGS:  Clear to auscultation bilaterally.  ABDOMEN:  Soft, nontender, nondistended with positive bowel sounds.  EXTREMITIES:  No clubbing, cyanosis or edema bilaterally.  SKIN:  No rashes or petechiae.  NEURO:  Cranial nerves grossly intact. No focal deficits.  PSYCH:  Alert and oriented x3.    LABORATORY    Lab Results   Component Value Date    WBC 5.84 2021    HGB 13.6 2021    HCT 42.4 2021    MCV 94.0 2021     2021    NEUTROABS 3.03 2021       Lab Results   Component Value Date     2021    K 4.2 2021     2021    CO2 28.5 2021    BUN 7 2021    CREATININE 0.70 2021    GLUCOSE 88 2021    CALCIUM 9.8 2021    AST 20 2021    ALT 34 (H) 2021    ALKPHOS 77 " 06/23/2021    BILITOT 0.4 06/23/2021    PROTEINTOT 7.0 06/06/2019    ALBUMIN 4.50 06/23/2021     Homocysteine level, serum (11/30/2021): pending  Homocysteine level, serum (08/26/2021): 12.4 umol/L  Homocysteine level, serum (05/27/2021): 82.3 umol/L  Homocysteine level, serum (06/06/2019): 29.4 umol/L  Homocysteine level, serum (05/11/2017): 24.5 umol/L  Homocysteine level, serum (05/13/2016): 53.2 umol/L    Extensive hypercoagulable workup on 11/15/2013 was negative (full results are available in our portal system) with the following exceptions:        Homocystine level, serum: > 65.0  UMOL/L (upper limit of normal: 13.9)   MTHFR, DNA analysis: Two copies of the same mutation (C677T and C677T) identified.   Factor II, DNA analysis: Single [id number] mutation identified       IMAGING    PATHOLOGY    IMPRESSION AND PLAN  Ms. Oconnor is a 51 y.o., white female with:  1. Hyperhomocysteinemia/hypercoagulable state: A significantly increased level (> 65 UMOL/L) was confirmed at the time of her hypercoagulable workup in 2013, and the results of the MTHFR DNA analysis provides an explanation for this. She has two copies of the C677T mutation, which has been shown to be associated with elevated homocysteine levels. Most importantly, this elevated homocysteine level is probably playing a major role in her (and, almost certainly, her family's) early-onset heart disease. Though the process is not fully understood, it is believed that elevated homocysteine levels contribute to chronic arterial wall inflammation. This exacerbates the effect of cholesterol, hypertension and other commonly associated risk factors for CAD, ultimately leading to premature plaque buildups and, consequently, earlier onset, premature heart disease and heart attacks. 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 While this is the second most common cause of an inherited thrombophilia, her risk of venous clotting  (such as DVTs or PEs) appears to be a relatively minor issue (although at her initial consultation  with us she reported a history of a “lung clot”, a review of her records from April 2012 showed that her cardiac event appeared to be her only diagnosis). This explained why she had never been on Coumadin (but rather, just ASA and Plavix) before she was started on it in October 2013 for her homocysteine levels. All of this being said, her factor II gene mutation is certainly not helping her coronary artery (or venous) thrombotic risk. I have had multiple, long discussions with the patient regarding these findings and issues (including, again, today). There is, unfortunately, no known way to completely or permanently correct her hyperhomocysteinemia (just as there is no “cure” for the presence of the mutated genes that are  leading to it). A repeat serum homocysteine level performed on 05/27/2021 had significantly reincreased (to ~82 umol/L). She was started on a different Vitamin B/folate supplementation at that time, and she has been consistently taking it since then. With this adjustment, the most recent repeat serum homocysteine level (on 08/26/2021) was much improved and actually within normal limits (12.4 umol/L). Even though decreasing her homocysteine level by as much as possible helps some (by hopefully reducing the associated vessel inflammation), continuing to address her more readily controllable risk factors (ongoing abstinence from smoking, statin dosing, etc.) and continuing ASA, Plavix and full anticoagulation (she continues to tolerate Coumadin overall well) as she has been doing, are all critical in mitigating her high risk of cardiovascular events as much as possible. She will continue to follow closely with her cardiologist as well (who has apparently scheduled a repeat stress test shortly, with a possible repeat [facility] to follow; the stress test has been delayed due to COVID). We will see her back in  our clinic in six months with repeat labs.  2. Genetic risk: The patient' s daughter all but certainly inherited one of the MTHFR C677T mutations (which might increase her longer term cardiac risk just as it did her mother's) and has a 50% chance of having her mother's copy of the prothrombin [id number] mutation (which, as mentioned, is the second most common cause of an inherited, mild thrombophilia). Particularly since her daughter is still in her mid-twenties, neither of these are likely reason enough to place her (the daughter) on antiplatelet drugs or full anticoagulation immediately, but they continue to be something of which they (the patient and her daughter) need to remain aware. The patient's daughter is currently undergoing a genetic counseling evaluation.  3. Nausea: A chronic issue related to her general intolerance of the smell of most foods. Phenergan is reportedly the only medicine that helps her overcome this symptom enough to be able to eat. Refills were again provided today.  The patient was in agreement with these plans.    It is a pleasure to participate in Ms. Oconnor's care. Please do not hesitate to call with any questions or concerns that you may have.    A total of 30 minutes were spent coordinating this patient’s care in clinic today; more than 50% of this time was face-to-face with the patient, reviewing her interim medical history, discussing the results of recent labwork and counseling on the current treatment and followup plan. All questions were answered to her satisfaction.    FOLLOW UP  Refills of prn phenergan provided today. Continue ASA, Plavix, Coumadin, B12 and folic acid. With cardiology and her PCP as previously planned. Return to our clinic in 6 months (~late May 2022) with a repeat homocysteine level.            This document was electronically signed by YURI Perkins MD November 30, 2021 13:56 EST      CC: MD Reinier Cantu IV, MD

## 2021-11-30 NOTE — TELEPHONE ENCOUNTER
Called patient with no answer. Left voicemail. Informed patient of Pt/INR results from today. Informed patient to call our office if she had any questions or concerns.

## 2021-11-30 NOTE — TELEPHONE ENCOUNTER
----- Message from YURI Perkins MD sent at 11/30/2021  2:10 PM EST -----  Regarding: INR level  Please let her know today's results. She should make her usual adjustments to her Coumadin dose but she should be sure to recheck her level as soon as she can (once she hopefully gets more supplies soon). Thanks.

## 2021-12-01 ENCOUNTER — TELEPHONE (OUTPATIENT)
Dept: ONCOLOGY | Facility: CLINIC | Age: 51
End: 2021-12-01

## 2021-12-01 LAB — HCYS SERPL-MCNC: 18.3 UMOL/L (ref 0–15)

## 2021-12-01 NOTE — TELEPHONE ENCOUNTER
Spoke with patient and told her the most recent INR results. She states she will discuss the managing physician

## 2021-12-17 RX ORDER — PROMETHAZINE HYDROCHLORIDE 25 MG/1
25 TABLET ORAL EVERY 8 HOURS PRN
Qty: 90 TABLET | Refills: 11 | Status: SHIPPED | OUTPATIENT
Start: 2021-12-17 | End: 2022-11-28 | Stop reason: SDUPTHER

## 2022-02-06 NOTE — ASSESSMENT & PLAN NOTE
[place] INPATIENT ENCOUNTER  ORTHOPEDIC SURGERY DAILY PROGRESS NOTE  UPPER EXTREMITIES    ADMISSION DATE:  2/3/2022  DATE:  2/6/2022  CURRENT HOSPITAL DAY:  Hospital Day: 4  SURGEON:  Surgeon(s) and Role:     * Salo Mendoza MD - Primary  ATTENDING PHYSICIAN:  Aditya Mckinney MD  CODE STATUS:  Full Resuscitation    PROCEDURE PERFORMED:  Procedure(s) (LRB):  ORIF LEFT WRIST, BILATERAL ASPIRATION RIGHT AND LEFT ELBOWS (Bilateral)  POST-OP DAY:  1 Day Post-Op    INTERVAL HISTORY:    Pain fairly controlled.  No chest pain, no shortness of breath.   Denies any associated numbness or tingling     MEDICATIONS:    The medication list was reviewed today.     OBJECTIVE:    Vital Signs  Vital Last Value 24 Hour Range   Temperature 98.5 °F (36.9 °C) Temp  Min: 98.1 °F (36.7 °C)  Max: 99 °F (37.2 °C)   Pulse 73 Pulse  Min: 71  Max: 96   Respiratory 18 Resp  Min: 16  Max: 20   Blood Pressure 130/79 BP  Min: 118/66  Max: 177/77   Pulse Oximetry 98 % SpO2  Min: 98 %  Max: 100 %     PHYSICAL EXAM:    Constitutional:  Awake and alert.  No acute distress.   Musculoskeletal: Positive intrinsics, , wrist flexion and extension on the right. Able to flex and extend exposed left fingers.  Positive median, ulnar and radial nerves, bilaterally.  Positive capillary refill.  Left Splint dressing clean, dry and intact. Bilateral arms in slings. Right wrist in removable splint.  Skin is warm and dry. Compartments soft BUE    LABORATORY DATA:  HGB (g/dL)   Date Value   02/06/2022 13.8       HCT (%)   Date Value   02/06/2022 40.3     PLT (K/mcL)   Date Value   02/06/2022 182     Sodium (mmol/L)   Date Value   02/04/2022 140      Potassium (mmol/L)   Date Value   02/04/2022 3.6      BUN (mg/dL)   Date Value   02/04/2022 17      Creatinine (mg/dL)   Date Value   02/04/2022 1.38 (H)        XR Chest AP or PA   Final Result   FINDINGS/IMPRESSION:       Comparison February 13, 2018.      No acute cardiopulmonary process.      Lungs are grossly clear.  · Check CMP and lipids today  · If LDL >70, consider switching to atorvastatin 40 mg daily        No pleural fluid or pneumothorax.      Heart size and pulmonary vasculature within normal limits.      CT WRIST LEFT   Final Result   IMPRESSION:      1. Minimally displaced ulnar styloid fracture.   2. Incidental note of a small carpal boss adjacent to the base of the third   metacarpal.                        EXAMINATION: CT WRIST WO CONTRAST LEFT      HISTORY: Fracture, wrist      COMPARISON: X-ray from same date      TECHNIQUE: Axial images are performed without contrast.  Reformatted images   were analyzed. Dose reduction techniques (to include either automated   exposure control, adjustment of the mA or kV according to the patient's   size, and/or use of iterative reconstruction technique) were utilized.       FINDINGS:      There is a severely comminuted, impacted, intra-articular fracture of the   distal radius. There is depression of the central fracture fragments of   approximately 3 mm. There is distraction and diastases of up to 5 mm of the   dominant comminuted fracture fragment. There is a mild degree of dorsal   offset of fracture fragments of approximately 4 mm. There is extension into   the distal radial metaphysis. There is no significant angulation of the   fracture. The alignment with the proximal carpal row is maintained.      There is a comminuted, nondisplaced fracture of the triquetrum.      There is bony hypertrophy with degenerative change at the dorsal aspect of   the articulation between the base of the third metacarpal and capitate as   seen on series 800, image 85.      There is mild dorsal soft tissue swelling. There is a focal region of   prominent fat in the dorsal soft tissues adjacent to the trapezoid and   scaphoid.         IMPRESSION:      1. Severely comminuted, distracted, and impacted intra-articular distal   radial fracture.   2. Nondisplaced triquetral fracture.      CT WRIST RIGHT   Final Result   IMPRESSION:      1. Minimally displaced ulnar styloid fracture.   2.  Incidental note of a small carpal boss adjacent to the base of the third   metacarpal.                        EXAMINATION: CT WRIST WO CONTRAST LEFT      HISTORY: Fracture, wrist      COMPARISON: X-ray from same date      TECHNIQUE: Axial images are performed without contrast.  Reformatted images   were analyzed. Dose reduction techniques (to include either automated   exposure control, adjustment of the mA or kV according to the patient's   size, and/or use of iterative reconstruction technique) were utilized.       FINDINGS:      There is a severely comminuted, impacted, intra-articular fracture of the   distal radius. There is depression of the central fracture fragments of   approximately 3 mm. There is distraction and diastases of up to 5 mm of the   dominant comminuted fracture fragment. There is a mild degree of dorsal   offset of fracture fragments of approximately 4 mm. There is extension into   the distal radial metaphysis. There is no significant angulation of the   fracture. The alignment with the proximal carpal row is maintained.      There is a comminuted, nondisplaced fracture of the triquetrum.      There is bony hypertrophy with degenerative change at the dorsal aspect of   the articulation between the base of the third metacarpal and capitate as   seen on series 800, image 85.      There is mild dorsal soft tissue swelling. There is a focal region of   prominent fat in the dorsal soft tissues adjacent to the trapezoid and   scaphoid.         IMPRESSION:      1. Severely comminuted, distracted, and impacted intra-articular distal   radial fracture.   2. Nondisplaced triquetral fracture.      XR Pelvis 1 or 2 Views   Final Result   IMPRESSION:      No fracture or dislocation of the pelvis the left femur      XR Femur 2 View Left   Final Result   IMPRESSION:      No fracture or dislocation of the pelvis the left femur      XR Wrist 3+ View Right   Final Result   IMPRESSION:      1. No acute findings  in the right wrist. If the patient has persistent   snuffbox tenderness, then recommend repeat radiographs in 7 to 10 days.      2. There is a mildly displaced and impacted left radial head fracture      CT CERVICAL SPINE WO CONTRAST   Final Result   IMPRESSION:        CT HEAD:    1.  No acute intracranial abnormality, specifically no acute intracranial   hemorrhage.    2.  No acute osseous abnormality.      CT CERVICAL SPINE:   1.  No acute osseous abnormality.      I, Attending Radiologist Daren Villa MD, have reviewed the images and   report and concur with these findings interpreted by Resident Radiologist,   Carlos Olivo MD.       CT HEAD WO CONTRAST   Final Result   IMPRESSION:        CT HEAD:    1.  No acute intracranial abnormality, specifically no acute intracranial   hemorrhage.    2.  No acute osseous abnormality.      CT CERVICAL SPINE:   1.  No acute osseous abnormality.      I, Attending Radiologist Daren Villa MD, have reviewed the images and   report and concur with these findings interpreted by Resident Radiologist,   Carlos Olivo MD.       XR Elbow 3 View Right   Final Result   IMPRESSION:      1. There is a comminuted fracture of the radial head.      XR Wrist 3+ View Left   Final Result   IMPRESSION:      Complex fracture deformity of the distal radius with probable piece form   dislocation. Cross-sectional imaging may be needed for further   characterization of fracture/dislocation etiology.               XR Elbow 3 View Left   Final Result   IMPRESSION:      1. No acute findings in the right wrist. If the patient has persistent   snuffbox tenderness, then recommend repeat radiographs in 7 to 10 days.      2. There is a mildly displaced and impacted left radial head fracture      CT ELBOW LEFT    (Results Pending)       ASSESSMENT:   1.  Displaced intraarticular three part left distal radius fracture.  2. Bilateral radial head fractures with elbow hemarthrosis  3. Left [place]  Lopresti injury  S/p ORIF left distal radius with cancellous bone grafting and spanning plate fixation  S/p bilateral elbow aspiration of heme arthrosis     PLAN:  Physical Therapy/Occupational Therapy, pain meds, anticoagulation/TEDs/sequential compression devices.  Left splint dressing to remain clean, dry, and intact  BUE in sling  Has left elbow CT scan scheduled for later today  Deep venous thrombosis/Venous thromboembolism Prophylaxis:  Venous thromboembolism Pharmacologic Prophylaxis: Yes  Venous thromboembolism Mechanical Prophylaxis: Yes

## 2022-05-20 ENCOUNTER — LAB (OUTPATIENT)
Dept: LAB | Facility: HOSPITAL | Age: 52
End: 2022-05-20

## 2022-05-20 ENCOUNTER — TRANSCRIBE ORDERS (OUTPATIENT)
Dept: LAB | Facility: HOSPITAL | Age: 52
End: 2022-05-20

## 2022-05-20 DIAGNOSIS — E78.5 DYSLIPIDEMIA: ICD-10-CM

## 2022-05-20 DIAGNOSIS — I25.119 CORONARY ARTERY DISEASE INVOLVING NATIVE CORONARY ARTERY OF NATIVE HEART WITH ANGINA PECTORIS: Chronic | ICD-10-CM

## 2022-05-20 DIAGNOSIS — I10 ESSENTIAL (PRIMARY) HYPERTENSION: ICD-10-CM

## 2022-05-20 DIAGNOSIS — I10 ESSENTIAL (PRIMARY) HYPERTENSION: Primary | ICD-10-CM

## 2022-05-20 DIAGNOSIS — I70.91 GENERALIZED ATHEROSCLEROSIS: ICD-10-CM

## 2022-05-20 DIAGNOSIS — E78.5 HYPERLIPIDEMIA LDL GOAL <70: ICD-10-CM

## 2022-05-20 DIAGNOSIS — I25.110 CORONARY ARTERY DISEASE INVOLVING NATIVE CORONARY ARTERY OF NATIVE HEART WITH UNSTABLE ANGINA PECTORIS: ICD-10-CM

## 2022-05-20 LAB
BASOPHILS # BLD AUTO: 0.07 10*3/MM3 (ref 0–0.2)
BASOPHILS NFR BLD AUTO: 1.1 % (ref 0–1.5)
DEPRECATED RDW RBC AUTO: 48 FL (ref 37–54)
EOSINOPHIL # BLD AUTO: 0.21 10*3/MM3 (ref 0–0.4)
EOSINOPHIL NFR BLD AUTO: 3.2 % (ref 0.3–6.2)
ERYTHROCYTE [DISTWIDTH] IN BLOOD BY AUTOMATED COUNT: 14 % (ref 12.3–15.4)
HCT VFR BLD AUTO: 39.6 % (ref 34–46.6)
HGB BLD-MCNC: 13.2 G/DL (ref 12–15.9)
IMM GRANULOCYTES # BLD AUTO: 0.01 10*3/MM3 (ref 0–0.05)
IMM GRANULOCYTES NFR BLD AUTO: 0.2 % (ref 0–0.5)
LYMPHOCYTES # BLD AUTO: 2.15 10*3/MM3 (ref 0.7–3.1)
LYMPHOCYTES NFR BLD AUTO: 33 % (ref 19.6–45.3)
MCH RBC QN AUTO: 31.4 PG (ref 26.6–33)
MCHC RBC AUTO-ENTMCNC: 33.3 G/DL (ref 31.5–35.7)
MCV RBC AUTO: 94.1 FL (ref 79–97)
MONOCYTES # BLD AUTO: 0.58 10*3/MM3 (ref 0.1–0.9)
MONOCYTES NFR BLD AUTO: 8.9 % (ref 5–12)
NEUTROPHILS NFR BLD AUTO: 3.5 10*3/MM3 (ref 1.7–7)
NEUTROPHILS NFR BLD AUTO: 53.6 % (ref 42.7–76)
NRBC BLD AUTO-RTO: 0 /100 WBC (ref 0–0.2)
PLATELET # BLD AUTO: 234 10*3/MM3 (ref 140–450)
PMV BLD AUTO: 11.3 FL (ref 6–12)
RBC # BLD AUTO: 4.21 10*6/MM3 (ref 3.77–5.28)
WBC NRBC COR # BLD: 6.52 10*3/MM3 (ref 3.4–10.8)

## 2022-05-20 PROCEDURE — 85025 COMPLETE CBC W/AUTO DIFF WBC: CPT

## 2022-05-20 PROCEDURE — 83090 ASSAY OF HOMOCYSTEINE: CPT

## 2022-05-20 PROCEDURE — 84443 ASSAY THYROID STIM HORMONE: CPT

## 2022-05-20 PROCEDURE — 36415 COLL VENOUS BLD VENIPUNCTURE: CPT

## 2022-05-20 PROCEDURE — 80061 LIPID PANEL: CPT

## 2022-05-20 PROCEDURE — 80053 COMPREHEN METABOLIC PANEL: CPT

## 2022-05-21 LAB
ALBUMIN SERPL-MCNC: 4.5 G/DL (ref 3.5–5.2)
ALBUMIN/GLOB SERPL: 2.3 G/DL
ALP SERPL-CCNC: 91 U/L (ref 39–117)
ALT SERPL W P-5'-P-CCNC: 23 U/L (ref 1–33)
ANION GAP SERPL CALCULATED.3IONS-SCNC: 8.8 MMOL/L (ref 5–15)
AST SERPL-CCNC: 19 U/L (ref 1–32)
BILIRUB SERPL-MCNC: 0.2 MG/DL (ref 0–1.2)
BUN SERPL-MCNC: 8 MG/DL (ref 6–20)
BUN/CREAT SERPL: 11.4 (ref 7–25)
CALCIUM SPEC-SCNC: 9.2 MG/DL (ref 8.6–10.5)
CHLORIDE SERPL-SCNC: 103 MMOL/L (ref 98–107)
CHOLEST SERPL-MCNC: 175 MG/DL (ref 0–200)
CO2 SERPL-SCNC: 24.2 MMOL/L (ref 22–29)
CREAT SERPL-MCNC: 0.7 MG/DL (ref 0.57–1)
EGFRCR SERPLBLD CKD-EPI 2021: 104.9 ML/MIN/1.73
GLOBULIN UR ELPH-MCNC: 2 GM/DL
GLUCOSE SERPL-MCNC: 89 MG/DL (ref 65–99)
HCYS SERPL-MCNC: 80.2 UMOL/L (ref 0–15)
HDLC SERPL-MCNC: 31 MG/DL (ref 40–60)
LDLC SERPL CALC-MCNC: 122 MG/DL (ref 0–100)
LDLC/HDLC SERPL: 3.88 {RATIO}
POTASSIUM SERPL-SCNC: 4.6 MMOL/L (ref 3.5–5.2)
PROT SERPL-MCNC: 6.5 G/DL (ref 6–8.5)
SODIUM SERPL-SCNC: 136 MMOL/L (ref 136–145)
TRIGL SERPL-MCNC: 119 MG/DL (ref 0–150)
TSH SERPL DL<=0.05 MIU/L-ACNC: 2.56 UIU/ML (ref 0.27–4.2)
VLDLC SERPL-MCNC: 22 MG/DL (ref 5–40)

## 2022-05-24 DIAGNOSIS — I25.110 CORONARY ARTERY DISEASE INVOLVING NATIVE CORONARY ARTERY OF NATIVE HEART WITH UNSTABLE ANGINA PECTORIS: ICD-10-CM

## 2022-05-24 DIAGNOSIS — I70.91 GENERALIZED ATHEROSCLEROSIS: ICD-10-CM

## 2022-05-24 DIAGNOSIS — Z79.899 LONG-TERM USE OF HIGH-RISK MEDICATION: ICD-10-CM

## 2022-05-24 DIAGNOSIS — E72.11 HYPERHOMOCYSTEINEMIA: Primary | ICD-10-CM

## 2022-05-25 RX ORDER — CYANOCOBALAMIN/FOLIC AC/VIT B6 1-2.5-25MG
1 TABLET ORAL DAILY
Qty: 30 TABLET | Refills: 11 | Status: SHIPPED | OUTPATIENT
Start: 2022-05-25

## 2022-07-20 ENCOUNTER — LAB (OUTPATIENT)
Dept: ONCOLOGY | Facility: CLINIC | Age: 52
End: 2022-07-20

## 2022-07-20 VITALS
TEMPERATURE: 98.4 F | OXYGEN SATURATION: 97 % | SYSTOLIC BLOOD PRESSURE: 116 MMHG | DIASTOLIC BLOOD PRESSURE: 76 MMHG | RESPIRATION RATE: 18 BRPM | HEART RATE: 106 BPM

## 2022-07-20 DIAGNOSIS — E72.11 HYPERHOMOCYSTEINEMIA: ICD-10-CM

## 2022-07-20 DIAGNOSIS — I25.110 CORONARY ARTERY DISEASE INVOLVING NATIVE CORONARY ARTERY OF NATIVE HEART WITH UNSTABLE ANGINA PECTORIS: ICD-10-CM

## 2022-07-20 DIAGNOSIS — I70.91 GENERALIZED ATHEROSCLEROSIS: ICD-10-CM

## 2022-07-20 DIAGNOSIS — Z79.899 LONG-TERM USE OF HIGH-RISK MEDICATION: ICD-10-CM

## 2022-07-20 LAB
ALBUMIN SERPL-MCNC: 4.68 G/DL (ref 3.5–5.2)
ALBUMIN/GLOB SERPL: 1.9 G/DL
ALP SERPL-CCNC: 93 U/L (ref 39–117)
ALT SERPL W P-5'-P-CCNC: 17 U/L (ref 1–33)
ANION GAP SERPL CALCULATED.3IONS-SCNC: 13.3 MMOL/L (ref 5–15)
AST SERPL-CCNC: 17 U/L (ref 1–32)
BASOPHILS # BLD AUTO: 0.04 10*3/MM3 (ref 0–0.2)
BASOPHILS NFR BLD AUTO: 0.7 % (ref 0–1.5)
BILIRUB SERPL-MCNC: 0.5 MG/DL (ref 0–1.2)
BUN SERPL-MCNC: 8 MG/DL (ref 6–20)
BUN/CREAT SERPL: 10.3 (ref 7–25)
CALCIUM SPEC-SCNC: 9.5 MG/DL (ref 8.6–10.5)
CHLORIDE SERPL-SCNC: 103 MMOL/L (ref 98–107)
CHOLEST SERPL-MCNC: 267 MG/DL (ref 0–200)
CO2 SERPL-SCNC: 23.7 MMOL/L (ref 22–29)
CREAT SERPL-MCNC: 0.78 MG/DL (ref 0.57–1)
DEPRECATED RDW RBC AUTO: 45.1 FL (ref 37–54)
EGFRCR SERPLBLD CKD-EPI 2021: 92.1 ML/MIN/1.73
EOSINOPHIL # BLD AUTO: 0.29 10*3/MM3 (ref 0–0.4)
EOSINOPHIL NFR BLD AUTO: 5.1 % (ref 0.3–6.2)
ERYTHROCYTE [DISTWIDTH] IN BLOOD BY AUTOMATED COUNT: 13.2 % (ref 12.3–15.4)
GLOBULIN UR ELPH-MCNC: 2.4 GM/DL
GLUCOSE SERPL-MCNC: 108 MG/DL (ref 65–99)
HCT VFR BLD AUTO: 43.5 % (ref 34–46.6)
HDLC SERPL-MCNC: 42 MG/DL (ref 40–60)
HGB BLD-MCNC: 14.8 G/DL (ref 12–15.9)
IMM GRANULOCYTES # BLD AUTO: 0.02 10*3/MM3 (ref 0–0.05)
IMM GRANULOCYTES NFR BLD AUTO: 0.3 % (ref 0–0.5)
LDLC SERPL CALC-MCNC: 207 MG/DL (ref 0–100)
LDLC/HDLC SERPL: 4.87 {RATIO}
LYMPHOCYTES # BLD AUTO: 2.04 10*3/MM3 (ref 0.7–3.1)
LYMPHOCYTES NFR BLD AUTO: 35.7 % (ref 19.6–45.3)
MCH RBC QN AUTO: 31.6 PG (ref 26.6–33)
MCHC RBC AUTO-ENTMCNC: 34 G/DL (ref 31.5–35.7)
MCV RBC AUTO: 92.9 FL (ref 79–97)
MONOCYTES # BLD AUTO: 0.5 10*3/MM3 (ref 0.1–0.9)
MONOCYTES NFR BLD AUTO: 8.7 % (ref 5–12)
NEUTROPHILS NFR BLD AUTO: 2.83 10*3/MM3 (ref 1.7–7)
NEUTROPHILS NFR BLD AUTO: 49.5 % (ref 42.7–76)
NRBC BLD AUTO-RTO: 0 /100 WBC (ref 0–0.2)
PLATELET # BLD AUTO: 217 10*3/MM3 (ref 140–450)
PMV BLD AUTO: 10.5 FL (ref 6–12)
POTASSIUM SERPL-SCNC: 3.8 MMOL/L (ref 3.5–5.2)
PROT SERPL-MCNC: 7.1 G/DL (ref 6–8.5)
RBC # BLD AUTO: 4.68 10*6/MM3 (ref 3.77–5.28)
SODIUM SERPL-SCNC: 140 MMOL/L (ref 136–145)
TRIGL SERPL-MCNC: 103 MG/DL (ref 0–150)
TSH SERPL DL<=0.05 MIU/L-ACNC: 2.51 UIU/ML (ref 0.27–4.2)
VLDLC SERPL-MCNC: 18 MG/DL (ref 5–40)
WBC NRBC COR # BLD: 5.72 10*3/MM3 (ref 3.4–10.8)

## 2022-07-20 PROCEDURE — 85025 COMPLETE CBC W/AUTO DIFF WBC: CPT | Performed by: INTERNAL MEDICINE

## 2022-07-20 PROCEDURE — 80053 COMPREHEN METABOLIC PANEL: CPT | Performed by: INTERNAL MEDICINE

## 2022-07-20 PROCEDURE — 84443 ASSAY THYROID STIM HORMONE: CPT | Performed by: INTERNAL MEDICINE

## 2022-07-20 PROCEDURE — 80061 LIPID PANEL: CPT | Performed by: INTERNAL MEDICINE

## 2022-07-20 PROCEDURE — 83090 ASSAY OF HOMOCYSTEINE: CPT | Performed by: INTERNAL MEDICINE

## 2022-07-21 LAB — HCYS SERPL-MCNC: 24.5 UMOL/L (ref 0–15)

## 2022-07-27 ENCOUNTER — OFFICE VISIT (OUTPATIENT)
Dept: ONCOLOGY | Facility: CLINIC | Age: 52
End: 2022-07-27

## 2022-07-27 VITALS
WEIGHT: 119.6 LBS | SYSTOLIC BLOOD PRESSURE: 131 MMHG | DIASTOLIC BLOOD PRESSURE: 71 MMHG | OXYGEN SATURATION: 98 % | RESPIRATION RATE: 18 BRPM | BODY MASS INDEX: 21.88 KG/M2 | TEMPERATURE: 98.2 F | HEART RATE: 106 BPM

## 2022-07-27 DIAGNOSIS — E72.11 HYPERHOMOCYSTEINEMIA: ICD-10-CM

## 2022-07-27 DIAGNOSIS — D68.51 FACTOR 5 LEIDEN MUTATION, HETEROZYGOUS: Primary | ICD-10-CM

## 2022-07-27 DIAGNOSIS — I70.91 GENERALIZED ATHEROSCLEROSIS: ICD-10-CM

## 2022-07-27 PROCEDURE — 99214 OFFICE O/P EST MOD 30 MIN: CPT | Performed by: INTERNAL MEDICINE

## 2022-07-27 NOTE — PROGRESS NOTES
Name:  Jania Oconnor  :  1970  Date:  2022     PRIMARY CARE PHYSICIAN  Rissa Mason MD    REASON FOR FOLLOWUP  1. Factor 5 Leiden mutation, heterozygous (HCC)    2. Hyperhomocysteinemia (HCC)      CHIEF COMPLAINT  Chronic anxiety and nausea, recently still stable.    Dear Dr. Mason,    HISTORY OF PRESENT ILLNESS:   I saw Ms. Oconnor in follow up today in our hematology clinic. As you are aware, she is a pleasant, 51 y.o., white female with an extensive family history of heart disease and early-age heart attacks (and resulting deaths) who, in 2012, presented to the [facility] ER with a severe episode of chest pain. She was found to have an acute, anterior, ST-elevation MI. She was med-flighted to [facility], where a cardiac catheterization was performed and she was found to have 95% blockage in her LAD. Stents were placed, and she was discharged on ASA and Plavix. She quit smoking at that time. She has done fairly well from this standpoint since; however, by 2013, she was noted to have a significantly elevated (and rising) homocysteine level. Her previous PCP ordered a hypercoagulable workup, started her on Coumadin and referred her to our clinic for further evaluation. The patient was initially evaluated by Dr. Castaneda on 11/15/2013, and a complete hypercoagulable panel was ordered.  I saw her for the first time in follow up in 2013. Between those visits, recommendations were made to continue lifelong anticoagulation (she has been doing well on Coumadin), B12 and folic acid (the latter two for her hereditary hyperhomocysteinemia, which likely played a large role in her early-age MI) in addition to ASA and Plavix. She was also referred to our genetic counselor.     INTERIM HISTORY:  Ms. Oconnor returns to clinic today for follow up by herself. She remains on Coumadin and is still tolerating it well (for the most part, she checks her levels and adjusts her daily  "dosage on her own). She is scheduled to undergo another stress test shortly (it has been delayed due to COVID). She may have potentially suffered a mild heart attack in early Summer 2021; but, otherwise, she has still not had any significant cardiac issues since her initial STEMI in 2012. In May 2021, she had to receive treatment for Lyme disease (she woke up one morning with ticks \"covering\" her left leg). The headache and thrush she was experiencing on this treatment remain resolved. Phenergan continues to be the only medicine that helps her eat (as the smell of food has chronically made her nauseous). She states that she does not need any refills (of Phenergan) at this time. She continues to take B-vitamin and folic acid supplements. She again has no new or other specific complaints and continues to feel overall well.    Past Medical History:   Diagnosis Date   • Anemia    • Anxiety    • Arthritis    • CHF (congestive heart failure) (HCC)    • Hyperlipidemia    • Lyme disease    • Metabolic disorder 2016   • Myocardial infarction (HCC)    • Panic disorder        Past Surgical History:   Procedure Laterality Date   • CARDIAC CATHETERIZATION  04/03/2012   • DILATATION AND CURETTAGE     • HYSTERECTOMY  2004   • SINUS SURGERY     • VASCULAR SURGERY         Social History     Socioeconomic History   • Marital status:    Tobacco Use   • Smoking status: Former Smoker     Packs/day: 0.50     Years: 15.00     Pack years: 7.50     Types: Cigarettes     Quit date: 4/3/2012     Years since quitting: 10.3   • Smokeless tobacco: Never Used   Vaping Use   • Vaping Use: Former   Substance and Sexual Activity   • Alcohol use: No   • Drug use: No   • Sexual activity: Defer       Family History   Problem Relation Age of Onset   • Lung cancer Mother    • Breast cancer Mother 47   • Heart attack Father 45        several MI's   • Heart failure Father    • Heart attack Sister 42   • Heart attack Brother 36   • Heart attack " Paternal Uncle 40   • Heart attack Paternal Uncle 45   • Heart attack Paternal Uncle 45   • Heart attack Paternal Uncle 40   • Heart attack Paternal Uncle 45   • Heart attack Paternal Uncle 45       Allergies   Allergen Reactions   • Atorvastatin GI Intolerance   • Erythromycin GI Intolerance       Current Outpatient Medications   Medication Sig Dispense Refill   • ALPRAZolam (XANAX) 1 MG tablet Take 1 mg by mouth 3 (Three) Times a Day As Needed for Anxiety.     • amLODIPine (NORVASC) 2.5 MG tablet Take 1 tablet by mouth Daily. 90 tablet 1   • aspirin 81 MG EC tablet Take 1 tablet by mouth Daily.     • clopidogrel (PLAVIX) 75 MG tablet Take 1 tablet by mouth Daily. 90 tablet 3   • doxycycline (VIBRAMYCIN) 100 MG capsule Take 100 mg by mouth 2 (Two) Times a Day.     • folic acid-vit B6-vit B12 (Folbee) 2.5-25-1 MG tablet tablet Take 1 tablet by mouth Daily. 30 tablet 11   • lisinopril (PRINIVIL,ZESTRIL) 10 MG tablet Take 1 tablet by mouth Daily.     • metoprolol succinate XL (TOPROL-XL) 25 MG 24 hr tablet Take 1 tablet by mouth Daily. 90 tablet 3   • mupirocin (BACTROBAN) 2 % ointment As Needed.     • nitroglycerin (NITROSTAT) 0.4 MG SL tablet Place 1 tablet under the tongue Every 5 (Five) Minutes As Needed for Chest Pain. Take no more than 3 doses in 15 minutes. 25 tablet 5   • nystatin (MYCOSTATIN) 318201 UNIT/ML suspension Swish and swallow 5 mL 4 (Four) Times a Day. 473 mL 1   • promethazine (PHENERGAN) 25 MG tablet Take 1 tablet by mouth Every 8 (Eight) Hours As Needed for Nausea or Vomiting. 90 tablet 11   • rosuvastatin (CRESTOR) 40 MG tablet Take 1 tablet by mouth Daily. 90 tablet 3   • warfarin (COUMADIN) 10 MG tablet Alternating between 8-10mg       No current facility-administered medications for this visit.     REVIEW OF SYSTEMS  CONSTITUTIONAL:  No fever, chills, night sweats or fatigue.  EYES:  No blurry vision, diplopia or other vision changes.  ENT:  No hearing loss or nosebleeds. Oral thrush, as per  the HPI above.  CARDIOVASCULAR:  No palpitations or edema. Intermittent dizzy episodes and bradycardia, as per the HPI above.  PULMONARY:  No hemoptysis, wheezing, chronic cough or shortness of breath.  GASTROINTESTINAL:  No constipation or diarrhea.  No abdominal pain. Chronic nausea and decreased appetite, as per the HPI above.  GENITOURINARY:  No hematuria, kidney stones or frequent urination.  MUSCULOSKELETAL:  No joint or back pains.  INTEGUMENTARY: No rashes or pruritus.  ENDOCRINE:  No excessive thirst or hot flashes.  HEMATOLOGIC:  No history of free bleeding, spontaneous bleeding or clotting.  IMMUNOLOGIC:  No allergies or frequent infections.  NEUROLOGIC: No numbness, tingling, seizures or weakness. Headaches, as per the HPI above.  PSYCHIATRIC:  No anxiety or depression.    PHYSICAL EXAMINATION  /71   Pulse 106   Temp 98.2 °F (36.8 °C) (Temporal)   Resp 18   Wt 54.3 kg (119 lb 9.6 oz)   SpO2 98%   BMI 21.88 kg/m²     ECO  GENERAL:  A well-developed, well-nourished, thin, white female in no acute distress.  HEENT:  Pupils equally round and reactive to light. Extraocular muscles intact.  CARDIOVASCULAR:  Regular rate and rhythm. No murmurs, gallops or rubs.  LUNGS:  Clear to auscultation bilaterally.  ABDOMEN:  Soft, nontender, nondistended with positive bowel sounds.  EXTREMITIES:  No clubbing, cyanosis or edema bilaterally.  SKIN:  No rashes or petechiae.  NEURO:  Cranial nerves grossly intact. No focal deficits.  PSYCH:  Alert and oriented x3.    The physical exam is unchanged from the previous one.    LABORATORY  Lab Results   Component Value Date    WBC 5.72 2022    HGB 14.8 2022    HCT 43.5 2022    MCV 92.9 2022     2022    NEUTROABS 2.83 2022       Lab Results   Component Value Date     2022    K 3.8 2022     2022    CO2 23.7 2022    BUN 8 2022    CREATININE 0.78 2022    GLUCOSE 108 (H)  07/20/2022    CALCIUM 9.5 07/20/2022    AST 17 07/20/2022    ALT 17 07/20/2022    ALKPHOS 93 07/20/2022    BILITOT 0.5 07/20/2022    PROTEINTOT 7.1 07/20/2022    ALBUMIN 4.68 07/20/2022     Homocysteine level, serum (07/20/2022): 24.5 umol/L  Homocysteine level, serum (05/20/2022): 80.2 umol/L  Homocysteine level, serum (11/30/2021): 18.3 umol/L  Homocysteine level, serum (08/26/2021): 12.4 umol/L  Homocysteine level, serum (05/27/2021): 82.3 umol/L  Homocysteine level, serum (06/06/2019): 29.4 umol/L  Homocysteine level, serum (05/11/2017): 24.5 umol/L  Homocysteine level, serum (05/13/2016): 53.2 umol/L    Extensive hypercoagulable workup on 11/15/2013 was negative (full results are available in our portal system) with the following exceptions:        Homocystine level, serum: > 65.0  UMOL/L (upper limit of normal: 13.9)   MTHFR, DNA analysis: Two copies of the same mutation (C677T and C677T) identified.   Factor II, DNA analysis: Single [id number] mutation identified       IMAGING    PATHOLOGY    IMPRESSION AND PLAN  Ms. Oconnor is a 51 y.o., white female with:  1. Hyperhomocysteinemia/hypercoagulable state: A significantly increased level (> 65 UMOL/L) was confirmed at the time of her hypercoagulable workup in 2013, and the results of the MTHFR DNA analysis provides an explanation for this. She has two copies of the C677T mutation, which has been shown to be associated with elevated homocysteine levels. Most importantly, this elevated homocysteine level is probably playing a major role in her (and, almost certainly, her family's) early-onset heart disease. Though the process is not fully understood, it is believed that elevated homocysteine levels contribute to chronic arterial wall inflammation. This exacerbates the effect of cholesterol, hypertension and other commonly associated risk factors for CAD, ultimately leading to premature plaque buildups and, consequently, earlier onset, premature heart disease and  heart attacks. 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 While this is the second most common cause of an inherited thrombophilia, her risk of venous clotting (such as DVTs or PEs) appears to be a relatively minor issue (although at her initial consultation  with us she reported a history of a “lung clot”, a review of her records from April 2012 showed that her cardiac event appeared to be her only diagnosis). This explained why she had never been on Coumadin (but rather, just ASA and Plavix) before she was started on it in October 2013 for her homocysteine levels. All of this being said, her factor II gene mutation is certainly not helping her coronary artery (or venous) thrombotic risk. I have had multiple, long discussions with the patient regarding these findings and issues (including, again, today). There is, unfortunately, no known way to completely or permanently correct her hyperhomocysteinemia (just as there is no “cure” for the presence of the mutated genes that are  leading to it). A repeat serum homocysteine level performed on 05/27/2021 had significantly reincreased (to ~82 umol/L). She was started on a different Vitamin B/folate supplementation at that time, and she has been consistently taking it since then. With this adjustment, the most recent repeat serum homocysteine level (on 07/20/2022) remains much improved (24.5 umol/L). Even though decreasing her homocysteine level by as much as possible helps some (by hopefully reducing the associated vessel inflammation), continuing to address her more readily controllable risk factors (ongoing abstinence from smoking, statin dosing, etc.) and continuing ASA, Plavix and full anticoagulation (she continues to tolerate Coumadin overall well) as she has been doing, all remain critical in mitigating her high risk of cardiovascular events as much as possible. She will continue to follow closely with her cardiologist.  We will see her back in our clinic in one year (midsummer 2023) with repeat labs.  2. Genetic risk: The patient' s daughter all but certainly inherited one of the MTHFR C677T mutations (which might increase her longer term cardiac risk just as it did her mother's) and has a 50% chance of having her mother's copy of the prothrombin [id number] mutation (which, as mentioned, is the second most common cause of an inherited, mild thrombophilia). Particularly since her daughter is still in her mid-twenties, neither of these are likely reason enough to place her (the daughter) on antiplatelet drugs or full anticoagulation immediately, but they continue to be something of which they (the patient and her daughter) need to remain aware. The patient's daughter has undergone both genetic counseling and hematology evaluations.  3. Nausea: A chronic issue related to her general intolerance of the smell of most foods. Phenergan is reportedly the only medicine that helps her overcome this symptom enough to be able to eat. She states the does not currently need refills, but our clinic will continue to provide them as needed.  The patient was in agreement with these plans.    It is a pleasure to participate in Ms. Oconnor's care. Please do not hesitate to call with any questions or concerns that you may have.    A total of 30 minutes were spent coordinating this patient’s care in clinic today; more than 50% of this time was face-to-face with the patient, reviewing her interim medical history, discussing the results of the most recent repeat labwork and counseling on the current treatment and followup plan. All questions were answered to her satisfaction.    FOLLOW UP  Continue prn Phenergan. Continue ASA, Plavix, Coumadin, B12 and folic acid. With cardiology and her PCP as previously planned. Return to our clinic in 1 year with a CBC, CMP and repeat homocysteine level.            This document was electronically signed by YURI Lloyd  MD Maddie July 27, 2022 14:47 EDT      CC: MD Reinier Cantu IV, MD

## 2022-11-28 RX ORDER — PROMETHAZINE HYDROCHLORIDE 25 MG/1
25 TABLET ORAL EVERY 8 HOURS PRN
Qty: 90 TABLET | Refills: 11 | Status: SHIPPED | OUTPATIENT
Start: 2022-11-28

## 2022-12-29 ENCOUNTER — HOSPITAL ENCOUNTER (OUTPATIENT)
Dept: MAMMOGRAPHY | Facility: HOSPITAL | Age: 52
Discharge: HOME | End: 2022-12-29
Admitting: RADIOLOGY

## 2022-12-29 DIAGNOSIS — Z12.31 VISIT FOR SCREENING MAMMOGRAM: ICD-10-CM

## 2022-12-29 DIAGNOSIS — N63.0 BREAST SWELLING: ICD-10-CM

## 2022-12-29 PROCEDURE — 77066 DX MAMMO INCL CAD BI: CPT

## 2022-12-29 PROCEDURE — G0279 TOMOSYNTHESIS, MAMMO: HCPCS | Performed by: RADIOLOGY

## 2022-12-29 PROCEDURE — 77066 DX MAMMO INCL CAD BI: CPT | Performed by: RADIOLOGY

## 2022-12-29 PROCEDURE — G0279 TOMOSYNTHESIS, MAMMO: HCPCS

## 2023-01-03 ENCOUNTER — HOSPITAL ENCOUNTER (OUTPATIENT)
Dept: ULTRASOUND IMAGING | Facility: HOSPITAL | Age: 53
Discharge: HOME | End: 2023-01-03
Admitting: RADIOLOGY
Payer: MEDICARE

## 2023-01-03 DIAGNOSIS — R92.8 ABNORMAL MAMMOGRAM OF LEFT BREAST: ICD-10-CM

## 2023-01-03 PROCEDURE — 76642 ULTRASOUND BREAST LIMITED: CPT

## 2023-01-03 PROCEDURE — 76642 ULTRASOUND BREAST LIMITED: CPT | Performed by: RADIOLOGY

## 2023-02-10 ENCOUNTER — OFFICE VISIT (OUTPATIENT)
Dept: SURGERY | Facility: CLINIC | Age: 53
End: 2023-02-10
Payer: MEDICARE

## 2023-02-10 VITALS
HEIGHT: 63 IN | SYSTOLIC BLOOD PRESSURE: 126 MMHG | DIASTOLIC BLOOD PRESSURE: 62 MMHG | BODY MASS INDEX: 21.09 KG/M2 | HEART RATE: 92 BPM | WEIGHT: 119 LBS

## 2023-02-10 DIAGNOSIS — Z80.3 FAMILY HISTORY OF BREAST CANCER: ICD-10-CM

## 2023-02-10 DIAGNOSIS — N64.4 BREAST PAIN: Primary | ICD-10-CM

## 2023-02-10 PROCEDURE — 99204 OFFICE O/P NEW MOD 45 MIN: CPT | Performed by: SURGERY

## 2023-02-10 NOTE — PROGRESS NOTES
Subjective   Jania Oconnor is a 52 y.o. female her today for left breast pain and swelling.    History of Present Illness  Ms. Oconnor was seen in the office today for breast evaluation.  This is a 52-year-old female who presents with an episode of left breast pain and swelling in December 2022.  Patient states that breast got very swollen and was extremely painful.  It has since resolved.  Patient has been seen for this problem at urgent care and was referred to the [place] [facility] and subsequently to the breast center.  States he had a bilateral diagnostic mammogram with tomosynthesis on 12/29/2022 and a left breast ultrasound on 1/3/2023.  Both of the studies were unremarkable and demonstrated no evidence of malignancy.  Patient denies a palpable mass or nipple discharge.  There is no prior history of breast biopsy or cyst aspiration.  Family history is positive for breast cancer in the patient's mother diagnosed at age 47.  Father has a history of prostate cancer.  Allergies   Allergen Reactions   • Atorvastatin GI Intolerance   • Erythromycin GI Intolerance     Current Outpatient Medications   Medication Sig Dispense Refill   • ALPRAZolam (XANAX) 1 MG tablet Take 1 mg by mouth 3 (Three) Times a Day As Needed for Anxiety.     • amLODIPine (NORVASC) 2.5 MG tablet Take 1 tablet by mouth Daily. 90 tablet 1   • aspirin 81 MG EC tablet Take 1 tablet by mouth Daily.     • clopidogrel (PLAVIX) 75 MG tablet Take 1 tablet by mouth Daily. 90 tablet 3   • folic acid-vit B6-vit B12 (Folbee) 2.5-25-1 MG tablet tablet Take 1 tablet by mouth Daily. 30 tablet 11   • lisinopril (PRINIVIL,ZESTRIL) 10 MG tablet Take 1 tablet by mouth Daily.     • metoprolol succinate XL (TOPROL-XL) 25 MG 24 hr tablet Take 1 tablet by mouth Daily. 90 tablet 3   • nitroglycerin (NITROSTAT) 0.4 MG SL tablet Place 1 tablet under the tongue Every 5 (Five) Minutes As Needed for Chest Pain. Take no more than 3 doses in 15 minutes. 25 tablet 5   •  "nystatin (MYCOSTATIN) 552219 UNIT/ML suspension Swish and swallow 5 mL 4 (Four) Times a Day. 473 mL 1   • promethazine (PHENERGAN) 25 MG tablet Take 1 tablet by mouth Every 8 (Eight) Hours As Needed for Nausea or Vomiting. 90 tablet 11   • rosuvastatin (CRESTOR) 40 MG tablet Take 1 tablet by mouth Daily. 90 tablet 3   • warfarin (COUMADIN) 10 MG tablet Alternating between 8-10mg       No current facility-administered medications for this visit.     Past Medical History:   Diagnosis Date   • Anemia    • Anxiety    • Arthritis    • CHF (congestive heart failure) (HCC)    • Hyperlipidemia    • Lyme disease    • Metabolic disorder 2016   • Myocardial infarction (HCC)    • Panic disorder      Past Surgical History:   Procedure Laterality Date   • CARDIAC CATHETERIZATION  04/03/2012   • DILATATION AND CURETTAGE     • HYSTERECTOMY  2004    benign   • SINUS SURGERY     • VASCULAR SURGERY         Pertinent Review of Systems:  Respiratory: no shortness of breath  Cardiovascular: no chest pain  Other pertinent: Patient has a history of hypercoagulable syndrome for which she is followed by hematology/oncology on a yearly basis.  Patient does have genetic mutations related to her hypercoagulable state.      Objective   /62 (BP Location: Left arm)   Pulse 92   Ht 160 cm (63\")   Wt 54 kg (119 lb)   BMI 21.08 kg/m²   Physical Exam  Well-developed well-nourished female  Neck:  No supraclavicular adenopathy  Lungs:  Respiratory effort normal. Auscultation: Clear, without wheezes, rhonchi, rales  Heart:  Regular rate and rhythm, without murmur, gallop, rub.  No pedal edema  Breasts: On visual inspection the breasts are symmetrical.  Examination of the right breast demonstrates no discrete mass, skin change, or axillary adenopathy.  Examination of the left breast demonstrates no discrete mass, skin change, or axillary adenopathy.       Procedures     Results/Data:  Imaging: Mammogram and ultrasound reports and images from " 12/29/2022 and 1/3/2023 were reviewed.  I agree with the assessment      Assessment & Plan   Left breast swelling which has subsequently resolved.  Clinically and radiographically there is no evidence of malignancy.  Specifically there is nothing to suggest inflammatory breast cancer  Strong family history of breast cancer    Genetic testing was drawn today and the results will be tracked.  Patient to resume routine mammography and to return for any change in examination         Discussion/Summary    Time spent:     BMI is within normal parameters. No other follow-up for BMI required.       Future Appointments   Date Time Provider Department Center   7/26/2023 12:30 PM MAIKEL NURSE [facility]   7/26/2023  1:00 PM YURI Perkins MD [facility]         Please note that portions of this note were completed with a voice recognition program.

## 2023-03-03 ENCOUNTER — TELEPHONE (OUTPATIENT)
Dept: SURGERY | Facility: CLINIC | Age: 53
End: 2023-03-03

## 2023-03-10 ENCOUNTER — TELEPHONE (OUTPATIENT)
Dept: SURGERY | Facility: CLINIC | Age: 53
End: 2023-03-10
Payer: MEDICARE

## 2023-03-10 NOTE — TELEPHONE ENCOUNTER
Informed patient that genetic test came back negative. Also explained that she should have her yearly mammograms and do her monthly self breast exams. She will be seeing Dr. Perkins the test will be in her chart if he has any question about it.

## 2023-07-26 ENCOUNTER — LAB (OUTPATIENT)
Dept: ONCOLOGY | Facility: CLINIC | Age: 53
End: 2023-07-26
Payer: MEDICARE

## 2023-07-26 ENCOUNTER — OFFICE VISIT (OUTPATIENT)
Dept: ONCOLOGY | Facility: CLINIC | Age: 53
End: 2023-07-26
Payer: MEDICARE

## 2023-07-26 VITALS
DIASTOLIC BLOOD PRESSURE: 70 MMHG | TEMPERATURE: 97.8 F | OXYGEN SATURATION: 96 % | RESPIRATION RATE: 18 BRPM | BODY MASS INDEX: 20.91 KG/M2 | HEART RATE: 76 BPM | WEIGHT: 118 LBS | SYSTOLIC BLOOD PRESSURE: 104 MMHG | HEIGHT: 63 IN

## 2023-07-26 DIAGNOSIS — D68.51 FACTOR 5 LEIDEN MUTATION, HETEROZYGOUS: ICD-10-CM

## 2023-07-26 DIAGNOSIS — Z79.899 LONG-TERM USE OF HIGH-RISK MEDICATION: ICD-10-CM

## 2023-07-26 DIAGNOSIS — I70.91 GENERALIZED ATHEROSCLEROSIS: ICD-10-CM

## 2023-07-26 DIAGNOSIS — E72.11 HYPERHOMOCYSTEINEMIA: ICD-10-CM

## 2023-07-26 DIAGNOSIS — E72.11 HYPERHOMOCYSTEINEMIA: Primary | ICD-10-CM

## 2023-07-26 LAB
ALBUMIN SERPL-MCNC: 4.2 G/DL (ref 3.5–5.2)
ALBUMIN/GLOB SERPL: 1.5 G/DL
ALP SERPL-CCNC: 103 U/L (ref 39–117)
ALT SERPL W P-5'-P-CCNC: 17 U/L (ref 1–33)
ANION GAP SERPL CALCULATED.3IONS-SCNC: 5.8 MMOL/L (ref 5–15)
AST SERPL-CCNC: 18 U/L (ref 1–32)
BASOPHILS # BLD AUTO: 0.06 10*3/MM3 (ref 0–0.2)
BASOPHILS NFR BLD AUTO: 1.3 % (ref 0–1.5)
BILIRUB SERPL-MCNC: 0.2 MG/DL (ref 0–1.2)
BUN SERPL-MCNC: 6 MG/DL (ref 6–20)
BUN/CREAT SERPL: 8.3 (ref 7–25)
CALCIUM SPEC-SCNC: 9 MG/DL (ref 8.6–10.5)
CHLORIDE SERPL-SCNC: 105 MMOL/L (ref 98–107)
CO2 SERPL-SCNC: 29.2 MMOL/L (ref 22–29)
CREAT SERPL-MCNC: 0.72 MG/DL (ref 0.57–1)
DEPRECATED RDW RBC AUTO: 50 FL (ref 37–54)
EGFRCR SERPLBLD CKD-EPI 2021: 100.7 ML/MIN/1.73
EOSINOPHIL # BLD AUTO: 0.28 10*3/MM3 (ref 0–0.4)
EOSINOPHIL NFR BLD AUTO: 5.9 % (ref 0.3–6.2)
ERYTHROCYTE [DISTWIDTH] IN BLOOD BY AUTOMATED COUNT: 14.4 % (ref 12.3–15.4)
GLOBULIN UR ELPH-MCNC: 2.8 GM/DL
GLUCOSE SERPL-MCNC: 111 MG/DL (ref 65–99)
HCT VFR BLD AUTO: 42.3 % (ref 34–46.6)
HGB BLD-MCNC: 13.9 G/DL (ref 12–15.9)
IMM GRANULOCYTES # BLD AUTO: 0 10*3/MM3 (ref 0–0.05)
IMM GRANULOCYTES NFR BLD AUTO: 0 % (ref 0–0.5)
LYMPHOCYTES # BLD AUTO: 2 10*3/MM3 (ref 0.7–3.1)
LYMPHOCYTES NFR BLD AUTO: 42.5 % (ref 19.6–45.3)
MCH RBC QN AUTO: 30.8 PG (ref 26.6–33)
MCHC RBC AUTO-ENTMCNC: 32.9 G/DL (ref 31.5–35.7)
MCV RBC AUTO: 93.8 FL (ref 79–97)
MONOCYTES # BLD AUTO: 0.33 10*3/MM3 (ref 0.1–0.9)
MONOCYTES NFR BLD AUTO: 7 % (ref 5–12)
NEUTROPHILS NFR BLD AUTO: 2.04 10*3/MM3 (ref 1.7–7)
NEUTROPHILS NFR BLD AUTO: 43.3 % (ref 42.7–76)
NRBC BLD AUTO-RTO: 0 /100 WBC (ref 0–0.2)
PLATELET # BLD AUTO: 225 10*3/MM3 (ref 140–450)
PMV BLD AUTO: 10.6 FL (ref 6–12)
POTASSIUM SERPL-SCNC: 3.9 MMOL/L (ref 3.5–5.2)
PROT SERPL-MCNC: 7 G/DL (ref 6–8.5)
RBC # BLD AUTO: 4.51 10*6/MM3 (ref 3.77–5.28)
SODIUM SERPL-SCNC: 140 MMOL/L (ref 136–145)
WBC NRBC COR # BLD: 4.71 10*3/MM3 (ref 3.4–10.8)

## 2023-07-26 PROCEDURE — 99214 OFFICE O/P EST MOD 30 MIN: CPT | Performed by: INTERNAL MEDICINE

## 2023-07-26 PROCEDURE — 80053 COMPREHEN METABOLIC PANEL: CPT | Performed by: INTERNAL MEDICINE

## 2023-07-26 PROCEDURE — 3078F DIAST BP <80 MM HG: CPT | Performed by: INTERNAL MEDICINE

## 2023-07-26 PROCEDURE — 83090 ASSAY OF HOMOCYSTEINE: CPT | Performed by: INTERNAL MEDICINE

## 2023-07-26 PROCEDURE — 1126F AMNT PAIN NOTED NONE PRSNT: CPT | Performed by: INTERNAL MEDICINE

## 2023-07-26 PROCEDURE — 3074F SYST BP LT 130 MM HG: CPT | Performed by: INTERNAL MEDICINE

## 2023-07-26 PROCEDURE — 85025 COMPLETE CBC W/AUTO DIFF WBC: CPT | Performed by: INTERNAL MEDICINE

## 2023-07-26 RX ORDER — FUROSEMIDE 40 MG/1
40 TABLET ORAL DAILY
COMMUNITY

## 2023-07-26 NOTE — PROGRESS NOTES
Venipuncture Blood Specimen Collection  Venipuncture performed in right arm by Rasheed Devlin MA with good hemostasis. Patient tolerated the procedure well without complications.   07/26/23   Rasheed Devlin MA

## 2023-07-26 NOTE — PROGRESS NOTES
Name:  Jania Oconnor  :  1970  Date:  2023     PRIMARY CARE PHYSICIAN  Rissa Mason MD    REASON FOR FOLLOWUP  1. Hyperhomocysteinemia      CHIEF COMPLAINT  None.    Dear Dr. Mason,    HISTORY OF PRESENT ILLNESS:   I saw Ms. Oconnor in follow up today in our hematology clinic. As you are aware, she is a pleasant, 52 y.o., white female with an extensive family history of heart disease and early-age heart attacks (and resulting deaths) who, in 2012, presented to the [facility] ER with a severe episode of chest pain. She was found to have an acute, anterior, ST-elevation MI. She was med-flighted to [facility], where a cardiac catheterization was performed and she was found to have 95% blockage in her LAD. Stents were placed, and she was discharged on ASA and Plavix. She quit smoking at that time. She has done fairly well from this standpoint since; however, by 2013, she was noted to have a significantly elevated (and rising) homocysteine level. Her previous PCP ordered a hypercoagulable workup, started her on Coumadin and referred her to our clinic for further evaluation. The patient was initially evaluated by Dr. Castaneda on 11/15/2013, and a complete hypercoagulable panel was ordered.  I saw her for the first time in follow up in 2013. Between those visits, recommendations were made to continue lifelong anticoagulation (she has been doing well on Coumadin), B12 and folic acid (the latter two for her hereditary hyperhomocysteinemia, which likely played a large role in her early-age MI) in addition to ASA and Plavix. She was also referred to our genetic counselor.     INTERIM HISTORY:  Ms. Oconnor returns to clinic today for follow up by herself. She remains on Coumadin and is still tolerating it well (for the most part, she checks her levels and adjusts her daily dosage on her own). She is scheduled to undergo another stress test shortly (it has been delayed due to  "COVID). She may have potentially suffered a mild heart attack in early Summer 2021; but, otherwise, she has still not had any significant cardiac issues since her initial STEMI in . In May 2021, she had to receive treatment for Lyme disease (she woke up one morning with ticks \"covering\" her left leg). The headache and thrush she was experiencing on this treatment remain resolved. Phenergan continues to be the only medicine that helps her eat (as the smell of food has chronically made her nauseous). She does not need any refills (of Phenergan) at this time. She continues to take daily Folbee (a combination B-vitamin and folic acid supplement). She again has no new or other specific complaints and continues to feel overall well.    Past Medical History:   Diagnosis Date    Anemia     Anxiety     Arthritis     CHF (congestive heart failure)     Hyperlipidemia     Lyme disease     Metabolic disorder 2016    Myocardial infarction     Panic disorder        Past Surgical History:   Procedure Laterality Date    CARDIAC CATHETERIZATION  2012    DILATATION AND CURETTAGE      HYSTERECTOMY  2004    benign    SINUS SURGERY      VASCULAR SURGERY         Social History     Socioeconomic History    Marital status:    Tobacco Use    Smoking status: Former     Packs/day: 0.50     Years: 15.00     Pack years: 7.50     Types: Cigarettes     Quit date: 4/3/2012     Years since quittin.3    Smokeless tobacco: Never   Vaping Use    Vaping Use: Former   Substance and Sexual Activity    Alcohol use: No    Drug use: No    Sexual activity: Defer       Family History   Problem Relation Age of Onset    Lung cancer Mother     Breast cancer Mother 47    Heart attack Father 45        several MI's    Heart failure Father     Heart attack Sister 42    Heart attack Brother 36    Heart attack Paternal Uncle 40    Heart attack Paternal Uncle 45    Heart attack Paternal Uncle 45    Heart attack Paternal Uncle 40    Heart attack " Paternal Uncle 45    Heart attack Paternal Uncle 45       Allergies   Allergen Reactions    Atorvastatin GI Intolerance    Erythromycin GI Intolerance       Current Outpatient Medications   Medication Sig Dispense Refill    ALPRAZolam (XANAX) 1 MG tablet Take 1 tablet by mouth 3 (Three) Times a Day As Needed for Anxiety.      amLODIPine (NORVASC) 2.5 MG tablet Take 1 tablet by mouth Daily. 90 tablet 1    aspirin 81 MG EC tablet Take 1 tablet by mouth Daily.      clopidogrel (PLAVIX) 75 MG tablet Take 1 tablet by mouth Daily. 90 tablet 3    folic acid-vit B6-vit B12 (Folbee) 2.5-25-1 MG tablet tablet Take 1 tablet by mouth Daily. 30 tablet 11    furosemide (LASIX) 40 MG tablet Take 1 tablet by mouth Daily.      lisinopril (PRINIVIL,ZESTRIL) 10 MG tablet Take 1 tablet by mouth Daily.      metoprolol succinate XL (TOPROL-XL) 25 MG 24 hr tablet Take 1 tablet by mouth Daily. 90 tablet 3    nitroglycerin (NITROSTAT) 0.4 MG SL tablet Place 1 tablet under the tongue Every 5 (Five) Minutes As Needed for Chest Pain. Take no more than 3 doses in 15 minutes. 25 tablet 5    nystatin (MYCOSTATIN) 137907 UNIT/ML suspension Swish and swallow 5 mL 4 (Four) Times a Day. 473 mL 1    promethazine (PHENERGAN) 25 MG tablet Take 1 tablet by mouth Every 8 (Eight) Hours As Needed for Nausea or Vomiting. 90 tablet 11    rosuvastatin (CRESTOR) 40 MG tablet Take 1 tablet by mouth Daily. 90 tablet 3    warfarin (COUMADIN) 10 MG tablet Alternating between 8-10mg       No current facility-administered medications for this visit.     REVIEW OF SYSTEMS  CONSTITUTIONAL:  No fever, chills, night sweats or fatigue.  EYES:  No blurry vision, diplopia or other vision changes.  ENT:  No hearing loss or nosebleeds. Oral thrush, as per the HPI above.  CARDIOVASCULAR:  No palpitations or edema. Intermittent dizzy episodes and bradycardia, as per the HPI above.  PULMONARY:  No hemoptysis, wheezing, chronic cough or shortness of breath.  GASTROINTESTINAL:  No  "constipation or diarrhea.  No abdominal pain. Chronic nausea and decreased appetite, as per the HPI above.  GENITOURINARY:  No hematuria, kidney stones or frequent urination.  MUSCULOSKELETAL:  No joint or back pains.  INTEGUMENTARY: No rashes or pruritus.  ENDOCRINE:  No excessive thirst or hot flashes.  HEMATOLOGIC:  No history of free bleeding, spontaneous bleeding or clotting.  IMMUNOLOGIC:  No allergies or frequent infections.  NEUROLOGIC: No numbness, tingling, seizures or weakness. Occasional headaches.  PSYCHIATRIC:  No anxiety or depression.    PHYSICAL EXAMINATION  /70   Pulse 76   Temp 97.8 °F (36.6 °C) (Temporal)   Resp 18   Ht 160 cm (63\")   Wt 53.5 kg (118 lb)   SpO2 96%   BMI 20.90 kg/m²     ECO  GENERAL:  A well-developed, well-nourished, thin, white female in no acute distress.  HEENT:  Pupils equally round and reactive to light. Extraocular muscles intact.  CARDIOVASCULAR:  Regular rate and rhythm. No murmurs, gallops or rubs.  LUNGS:  Clear to auscultation bilaterally.  ABDOMEN:  Soft, nontender, nondistended with positive bowel sounds.  EXTREMITIES:  No clubbing, cyanosis or edema bilaterally.  SKIN:  No rashes or petechiae.  NEURO:  Cranial nerves grossly intact. No focal deficits.  PSYCH:  Alert and oriented x3.    The physical exam is unchanged from the one last year.    LABORATORY  Lab Results   Component Value Date    WBC 4.71 2023    HGB 13.9 2023    HCT 42.3 2023    MCV 93.8 2023     2023    NEUTROABS 2.04 2023       Lab Results   Component Value Date     2022    K 3.8 2022     2022    CO2 23.7 2022    BUN 8 2022    CREATININE 0.78 2022    GLUCOSE 108 (H) 2022    CALCIUM 9.5 2022    AST 17 2022    ALT 17 2022    ALKPHOS 93 2022    BILITOT 0.5 2022    PROTEINTOT 7.1 2022    ALBUMIN 4.68 2022     Homocysteine level, serum (2023): " pending  Homocysteine level, serum (07/20/2022): 24.5 umol/L  Homocysteine level, serum (05/20/2022): 80.2 umol/L  Homocysteine level, serum (11/30/2021): 18.3 umol/L  Homocysteine level, serum (08/26/2021): 12.4 umol/L  Homocysteine level, serum (05/27/2021): 82.3 umol/L  Homocysteine level, serum (06/06/2019): 29.4 umol/L  Homocysteine level, serum (05/11/2017): 24.5 umol/L  Homocysteine level, serum (05/13/2016): 53.2 umol/L    Extensive hypercoagulable workup on 11/15/2013 was negative (full results are available in our portal system) with the following exceptions:        Homocystine level, serum: > 65.0  UMOL/L (upper limit of normal: 13.9)   MTHFR, DNA analysis: Two copies of the same mutation (C677T and C677T) identified.   Factor II, DNA analysis: Single [id number] mutation identified       IMAGING    PATHOLOGY    IMPRESSION AND PLAN  Ms. Oconnor is a 52 y.o., white female with:  Hyperhomocysteinemia/hypercoagulable state: A significantly increased level (> 65 UMOL/L) was confirmed at the time of her hypercoagulable workup in 2013, and the results of the MTHFR DNA analysis provides an explanation for this. She has two copies of the C677T mutation, which has been shown to be associated with elevated homocysteine levels. Most importantly, this elevated homocysteine level is probably playing a major role in her (and, almost certainly, her family's) early-onset heart disease. Though the process is not fully understood, it is believed that elevated homocysteine levels contribute to chronic arterial wall inflammation. This exacerbates the effect of cholesterol, hypertension and other commonly associated risk factors for CAD, ultimately leading to premature plaque buildups and, consequently, earlier onset, premature heart disease and heart attacks. 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 While this is the second most common cause of an inherited thrombophilia, her risk of  venous clotting (such as DVTs or PEs) appears to be a relatively minor issue (although at her initial consultation  with us she reported a history of a “lung clot”, a review of her records from April 2012 showed that her cardiac event appeared to be her only diagnosis). This explained why she had never been on Coumadin (but rather, just ASA and Plavix) before she was started on it in October 2013 for her homocysteine levels. All of this being said, her factor II gene mutation is certainly not helping her coronary artery (or venous) thrombotic risk. I have had multiple, long discussions with the patient regarding these findings and issues (including, again, today). There is, unfortunately, no known way to completely or permanently correct her hyperhomocysteinemia (just as there is no “cure” for the presence of the mutated genes that are  leading to it). A repeat serum homocysteine level performed on 05/27/2021 had significantly reincreased (to ~82 umol/L). She was started on a different Vitamin B/folate supplementation at that time (daily Folbee), and she has been consistently taking it since then. With this adjustment, the most recent repeat serum homocysteine level (on 07/20/2022) remains much improved (24.5 umol/L). The repeat level drawn today (07/26/2023) was pending at the time of this dictation. Even though decreasing her homocysteine level by as much as possible helps some (by hopefully reducing the associated vessel inflammation), continuing to address her more readily controllable risk factors (ongoing abstinence from smoking, statin dosing, etc.) and continuing ASA, Plavix and full anticoagulation (she continues to tolerate Coumadin overall well) as she has been doing, all remain critical in mitigating her high risk of cardiovascular events as much as possible. She will continue to follow closely with her cardiologist. We will see her back in our clinic in one year (midsummer 2024) with repeat  labs.  Genetic risk: The patient' s daughter all but certainly inherited one of the MTHFR C677T mutations (which might increase her longer term cardiac risk just as it did her mother's) and has a 50% chance of having her mother's copy of the prothrombin [id number] mutation (which, as mentioned, is the second most common cause of an inherited, mild thrombophilia). Particularly since her daughter is still in her mid-twenties, neither of these are likely reason enough to place her (the daughter) on antiplatelet drugs or full anticoagulation immediately, but they continue to be something of which they (the patient and her daughter) need to remain aware. The patient's daughter has undergone both genetic counseling and hematology evaluations.  Nausea: A chronic issue related to her general intolerance of the smell of most foods. Phenergan is reportedly the only medicine that helps her overcome this symptom enough to be able to eat. She states the does not need refills at this time, but our clinic will continue to provide them as needed.  The patient was in agreement with these plans.    It is a pleasure to participate in Ms. Oconnor's care. Please do not hesitate to call with any questions or concerns that you may have.    A total of 30 minutes were spent coordinating this patient’s care in clinic today; more than 50% of this time was face-to-face with the patient, reviewing her interim medical history and counseling on the current treatment and followup plan. All questions were answered to her satisfaction.    FOLLOW UP  Continue prn Phenergan. Continue ASA, Plavix, Coumadin and daily Folbee. With cardiology and her PCP as previously planned. Return to our clinic in 1 year (July 2024) with a CBC, CMP and repeat homocysteine level.            This document was electronically signed by YURI Perkins MD July 26, 2023 13:04 EDT      CC: MD Reinier Cantu IV, MD

## 2023-07-27 ENCOUNTER — TELEPHONE (OUTPATIENT)
Dept: ONCOLOGY | Facility: CLINIC | Age: 53
End: 2023-07-27
Payer: MEDICARE

## 2023-07-27 LAB — HCYS SERPL-MCNC: 64.3 UMOL/L (ref 0–15)

## 2023-07-27 RX ORDER — CYANOCOBALAMIN/FOLIC AC/VIT B6 1-2.5-25MG
1 TABLET ORAL 2 TIMES DAILY
Qty: 60 TABLET | Refills: 11 | Status: SHIPPED | OUTPATIENT
Start: 2023-07-27

## 2023-07-27 NOTE — TELEPHONE ENCOUNTER
Caller: Jania Oconnor    Relationship: Self    Best call back number: [phone]      What was the call regarding: PATIENT MISSED CALL FROM THE OFFICE

## 2023-09-25 ENCOUNTER — TELEPHONE (OUTPATIENT)
Dept: ONCOLOGY | Facility: CLINIC | Age: 53
End: 2023-09-25

## 2023-09-25 NOTE — TELEPHONE ENCOUNTER
Caller: Jania Oconnor     Relationship: [unfilled]     Best call back number: [phone]     What is your medical concern? PT JUST WENT TO URGENT CARE WITH A SEVERE UTI. THEY HAVE PUT THE PT ON ANTIBIOTICS, BUT SOME OF THE STUFF THAT CAME UP IN THE URINE IS CONCERNING AND THEY ARE WANTING THE PT TO BE SEEN BY DR. COLINDRES. THE ARE WANTING THE PT TO HAVE HER INR CHECKED, AND THEY ARE CONCERNED WITH HOW THE  AMITOTICS WILL EFFECT HER CURRENT MEDICATIONS AND INR.     THEY ALSO SUGGEST PT HAVING CULTURE REDONE. THERE WAS BLOOD IN URINE.     How long has this issue been going on? 09/12/23 - PT RESULTS JUST BACK 09/24/23    Is your provider already aware of this issue? NO    PLEASE CALL THE PT BACK TO ADVISE AND MAKE APPT.

## 2023-10-23 ENCOUNTER — LAB (OUTPATIENT)
Dept: ONCOLOGY | Facility: CLINIC | Age: 53
End: 2023-10-23
Payer: MEDICARE

## 2023-10-23 VITALS
HEART RATE: 120 BPM | OXYGEN SATURATION: 96 % | SYSTOLIC BLOOD PRESSURE: 104 MMHG | RESPIRATION RATE: 18 BRPM | TEMPERATURE: 96.7 F | DIASTOLIC BLOOD PRESSURE: 65 MMHG

## 2023-10-23 DIAGNOSIS — D68.51 FACTOR 5 LEIDEN MUTATION, HETEROZYGOUS: ICD-10-CM

## 2023-10-23 DIAGNOSIS — Z79.899 LONG-TERM USE OF HIGH-RISK MEDICATION: ICD-10-CM

## 2023-10-23 DIAGNOSIS — E72.11 HYPERHOMOCYSTEINEMIA: ICD-10-CM

## 2023-10-23 DIAGNOSIS — I70.91 GENERALIZED ATHEROSCLEROSIS: ICD-10-CM

## 2023-10-23 LAB
ALBUMIN SERPL-MCNC: 4.4 G/DL (ref 3.5–5.2)
ALBUMIN/GLOB SERPL: 1.7 G/DL
ALP SERPL-CCNC: 96 U/L (ref 39–117)
ALT SERPL W P-5'-P-CCNC: 17 U/L (ref 1–33)
ANION GAP SERPL CALCULATED.3IONS-SCNC: 6.8 MMOL/L (ref 5–15)
AST SERPL-CCNC: 19 U/L (ref 1–32)
BASOPHILS # BLD AUTO: 0.04 10*3/MM3 (ref 0–0.2)
BASOPHILS NFR BLD AUTO: 0.8 % (ref 0–1.5)
BILIRUB SERPL-MCNC: 0.3 MG/DL (ref 0–1.2)
BUN SERPL-MCNC: 7 MG/DL (ref 6–20)
BUN/CREAT SERPL: 9 (ref 7–25)
CALCIUM SPEC-SCNC: 9.3 MG/DL (ref 8.6–10.5)
CHLORIDE SERPL-SCNC: 105 MMOL/L (ref 98–107)
CO2 SERPL-SCNC: 28.2 MMOL/L (ref 22–29)
CREAT SERPL-MCNC: 0.78 MG/DL (ref 0.57–1)
DEPRECATED RDW RBC AUTO: 46.7 FL (ref 37–54)
EGFRCR SERPLBLD CKD-EPI 2021: 91.5 ML/MIN/1.73
EOSINOPHIL # BLD AUTO: 0.26 10*3/MM3 (ref 0–0.4)
EOSINOPHIL NFR BLD AUTO: 5.2 % (ref 0.3–6.2)
ERYTHROCYTE [DISTWIDTH] IN BLOOD BY AUTOMATED COUNT: 13.7 % (ref 12.3–15.4)
GLOBULIN UR ELPH-MCNC: 2.6 GM/DL
GLUCOSE SERPL-MCNC: 108 MG/DL (ref 65–99)
HCT VFR BLD AUTO: 43.3 % (ref 34–46.6)
HGB BLD-MCNC: 14.5 G/DL (ref 12–15.9)
IMM GRANULOCYTES # BLD AUTO: 0.02 10*3/MM3 (ref 0–0.05)
IMM GRANULOCYTES NFR BLD AUTO: 0.4 % (ref 0–0.5)
LYMPHOCYTES # BLD AUTO: 1.96 10*3/MM3 (ref 0.7–3.1)
LYMPHOCYTES NFR BLD AUTO: 39.4 % (ref 19.6–45.3)
MCH RBC QN AUTO: 31.3 PG (ref 26.6–33)
MCHC RBC AUTO-ENTMCNC: 33.5 G/DL (ref 31.5–35.7)
MCV RBC AUTO: 93.5 FL (ref 79–97)
MONOCYTES # BLD AUTO: 0.31 10*3/MM3 (ref 0.1–0.9)
MONOCYTES NFR BLD AUTO: 6.2 % (ref 5–12)
NEUTROPHILS NFR BLD AUTO: 2.38 10*3/MM3 (ref 1.7–7)
NEUTROPHILS NFR BLD AUTO: 48 % (ref 42.7–76)
NRBC BLD AUTO-RTO: 0 /100 WBC (ref 0–0.2)
PLATELET # BLD AUTO: 212 10*3/MM3 (ref 140–450)
PMV BLD AUTO: 10.9 FL (ref 6–12)
POTASSIUM SERPL-SCNC: 4.5 MMOL/L (ref 3.5–5.2)
PROT SERPL-MCNC: 7 G/DL (ref 6–8.5)
RBC # BLD AUTO: 4.63 10*6/MM3 (ref 3.77–5.28)
SODIUM SERPL-SCNC: 140 MMOL/L (ref 136–145)
WBC NRBC COR # BLD: 4.97 10*3/MM3 (ref 3.4–10.8)

## 2023-10-23 PROCEDURE — 85025 COMPLETE CBC W/AUTO DIFF WBC: CPT | Performed by: INTERNAL MEDICINE

## 2023-10-23 PROCEDURE — 80053 COMPREHEN METABOLIC PANEL: CPT | Performed by: INTERNAL MEDICINE

## 2023-10-23 PROCEDURE — 83090 ASSAY OF HOMOCYSTEINE: CPT | Performed by: INTERNAL MEDICINE

## 2023-10-23 NOTE — PROGRESS NOTES
Venipuncture Blood Specimen Collection  Venipuncture performed in right arm by Rosanne Shukla MA with good hemostasis. Patient tolerated the procedure well without complications.   10/23/23   Rosanne Shukla MA

## 2023-10-24 ENCOUNTER — OFFICE VISIT (OUTPATIENT)
Dept: ONCOLOGY | Facility: CLINIC | Age: 53
End: 2023-10-24
Payer: MEDICARE

## 2023-10-24 VITALS
OXYGEN SATURATION: 96 % | RESPIRATION RATE: 18 BRPM | WEIGHT: 122.2 LBS | TEMPERATURE: 97.7 F | HEIGHT: 63 IN | BODY MASS INDEX: 21.65 KG/M2 | DIASTOLIC BLOOD PRESSURE: 77 MMHG | SYSTOLIC BLOOD PRESSURE: 125 MMHG | HEART RATE: 73 BPM

## 2023-10-24 DIAGNOSIS — I70.91 GENERALIZED ATHEROSCLEROSIS: ICD-10-CM

## 2023-10-24 DIAGNOSIS — I25.110 CORONARY ARTERY DISEASE INVOLVING NATIVE CORONARY ARTERY OF NATIVE HEART WITH UNSTABLE ANGINA PECTORIS: ICD-10-CM

## 2023-10-24 DIAGNOSIS — Z79.899 LONG-TERM USE OF HIGH-RISK MEDICATION: ICD-10-CM

## 2023-10-24 DIAGNOSIS — E72.11 HYPERHOMOCYSTEINEMIA: Primary | ICD-10-CM

## 2023-10-24 DIAGNOSIS — D68.51 FACTOR 5 LEIDEN MUTATION, HETEROZYGOUS: ICD-10-CM

## 2023-10-24 LAB — HCYS SERPL-MCNC: 12.5 UMOL/L (ref 0–15)

## 2023-10-24 PROCEDURE — 99214 OFFICE O/P EST MOD 30 MIN: CPT | Performed by: INTERNAL MEDICINE

## 2023-10-24 PROCEDURE — 3078F DIAST BP <80 MM HG: CPT | Performed by: INTERNAL MEDICINE

## 2023-10-24 PROCEDURE — 3074F SYST BP LT 130 MM HG: CPT | Performed by: INTERNAL MEDICINE

## 2023-10-24 PROCEDURE — 1126F AMNT PAIN NOTED NONE PRSNT: CPT | Performed by: INTERNAL MEDICINE

## 2023-10-24 NOTE — PROGRESS NOTES
Name:  Jania Oconnor  :  1970  Date:  10/24/2023     PRIMARY CARE PHYSICIAN  Rissa Mason MD    REASON FOR FOLLOWUP  1. Hyperhomocysteinemia      CHIEF COMPLAINT  None.    Dear Dr. Mason,    HISTORY OF PRESENT ILLNESS:   I saw Ms. Oconnor in follow up today in our hematology clinic. As you are aware, she is a pleasant, 52 y.o., white female with an extensive family history of heart disease and early-age heart attacks (and resulting deaths) who, in 2012, presented to the [facility] ER with a severe episode of chest pain. She was found to have an acute, anterior, ST-elevation MI. She was med-flighted to [facility], where a cardiac catheterization was performed and she was found to have 95% blockage in her LAD. Stents were placed, and she was discharged on ASA and Plavix. She quit smoking at that time. She has done fairly well from this standpoint since; however, by 2013, she was noted to have a significantly elevated (and rising) homocysteine level. Her previous PCP ordered a hypercoagulable workup, started her on Coumadin and referred her to our clinic for further evaluation. The patient was initially evaluated by Dr. Castaneda on 11/15/2013, and a complete hypercoagulable panel was ordered.  I saw her for the first time in follow up in 2013. Between those visits, recommendations were made to continue lifelong anticoagulation (she has been doing well on Coumadin), B12 and folic acid (the latter two for her hereditary hyperhomocysteinemia, which likely played a large role in her early-age MI) in addition to ASA and Plavix. She was also referred to our genetic counselor.     INTERIM HISTORY:  Ms. Oconnor returns to clinic today for follow up by herself. She remains on Coumadin and is still tolerating it well (for the most part, she checks her levels and adjusts her daily dosage on her own). She may have potentially suffered a mild heart attack in early Summer 2021; but,  "otherwise, she has still not had any significant cardiac issues since her initial STEMI in . In May 2021, she had to receive treatment for Lyme disease (she woke up one morning with ticks \"covering\" her left leg). The headache and thrush she was experiencing on this treatment remain resolved. Phenergan continues to be the only medicine that helps her eat (as the smell of food has chronically made her nauseous). She is requesting a refill (of Phenergan) at this time. She continues to take daily Folbee (a combination B-vitamin and folic acid supplement). She again has no new or other specific complaints and continues to feel overall well.    Past Medical History:   Diagnosis Date    Anemia     Anxiety     Arthritis     CHF (congestive heart failure)     Hyperlipidemia     Lyme disease     Metabolic disorder 2016    Myocardial infarction     Panic disorder        Past Surgical History:   Procedure Laterality Date    CARDIAC CATHETERIZATION  2012    DILATATION AND CURETTAGE      HYSTERECTOMY  2004    benign    SINUS SURGERY      VASCULAR SURGERY         Social History     Socioeconomic History    Marital status:    Tobacco Use    Smoking status: Former     Packs/day: 0.50     Years: 15.00     Additional pack years: 0.00     Total pack years: 7.50     Types: Cigarettes     Quit date: 4/3/2012     Years since quittin.5    Smokeless tobacco: Never   Vaping Use    Vaping Use: Former   Substance and Sexual Activity    Alcohol use: No    Drug use: No    Sexual activity: Defer       Family History   Problem Relation Age of Onset    Lung cancer Mother     Breast cancer Mother 47    Heart attack Father 45        several MI's    Heart failure Father     Heart attack Sister 42    Heart attack Brother 36    Heart attack Paternal Uncle 40    Heart attack Paternal Uncle 45    Heart attack Paternal Uncle 45    Heart attack Paternal Uncle 40    Heart attack Paternal Uncle 45    Heart attack Paternal Uncle 45 "       Allergies   Allergen Reactions    Atorvastatin GI Intolerance    Erythromycin GI Intolerance       Current Outpatient Medications   Medication Sig Dispense Refill    ALPRAZolam (XANAX) 1 MG tablet Take 1 tablet by mouth 3 (Three) Times a Day As Needed for Anxiety.      amLODIPine (NORVASC) 2.5 MG tablet Take 1 tablet by mouth Daily. 90 tablet 1    aspirin 81 MG EC tablet Take 1 tablet by mouth Daily.      clopidogrel (PLAVIX) 75 MG tablet Take 1 tablet by mouth Daily. 90 tablet 3    folic acid-vit B6-vit B12 (Folbee) 2.5-25-1 MG tablet tablet Take 1 tablet by mouth 2 (Two) Times a Day. 60 tablet 11    furosemide (LASIX) 40 MG tablet Take 1 tablet by mouth Daily.      lisinopril (PRINIVIL,ZESTRIL) 10 MG tablet Take 1 tablet by mouth Daily.      metoprolol succinate XL (TOPROL-XL) 25 MG 24 hr tablet Take 1 tablet by mouth Daily. 90 tablet 3    nitroglycerin (NITROSTAT) 0.4 MG SL tablet Place 1 tablet under the tongue Every 5 (Five) Minutes As Needed for Chest Pain. Take no more than 3 doses in 15 minutes. 25 tablet 5    nystatin (MYCOSTATIN) 496418 UNIT/ML suspension Swish and swallow 5 mL 4 (Four) Times a Day. 473 mL 1    promethazine (PHENERGAN) 25 MG tablet Take 1 tablet by mouth Every 8 (Eight) Hours As Needed for Nausea or Vomiting. 90 tablet 11    rosuvastatin (CRESTOR) 40 MG tablet Take 1 tablet by mouth Daily. 90 tablet 3    warfarin (COUMADIN) 10 MG tablet Alternating between 8-10mg       No current facility-administered medications for this visit.     REVIEW OF SYSTEMS  CONSTITUTIONAL:  No fever, chills, night sweats or fatigue.  EYES:  No blurry vision, diplopia or other vision changes.  ENT:  No hearing loss or nosebleeds.  CARDIOVASCULAR:  No palpitations or edema. Intermittent dizzy episodes and bradycardia, as per the HPI above.  PULMONARY:  No hemoptysis, wheezing, chronic cough or shortness of breath.  GASTROINTESTINAL:  No constipation or diarrhea.  No abdominal pain. Chronic nausea and  "decreased appetite, as per the HPI above.  GENITOURINARY:  No hematuria, kidney stones or frequent urination.  MUSCULOSKELETAL:  No joint or back pains.  INTEGUMENTARY: No rashes or pruritus.  ENDOCRINE:  No excessive thirst or hot flashes.  HEMATOLOGIC:  No history of free bleeding, spontaneous bleeding or clotting.  IMMUNOLOGIC:  No allergies or frequent infections.  NEUROLOGIC: No numbness, tingling, seizures or weakness.  PSYCHIATRIC:  No anxiety or depression.    PHYSICAL EXAMINATION  /77   Pulse 73   Temp 97.7 °F (36.5 °C) (Temporal)   Resp 18   Ht 160 cm (63\")   Wt 55.4 kg (122 lb 3.2 oz)   SpO2 96%   BMI 21.65 kg/m²     ECO  GENERAL:  A well-developed, well-nourished, thin, white female in no acute distress.  HEENT:  Pupils equally round and reactive to light. Extraocular muscles intact.  CARDIOVASCULAR:  Regular rate and rhythm. No murmurs, gallops or rubs.  LUNGS:  Clear to auscultation bilaterally.  ABDOMEN:  Soft, nontender, nondistended with positive bowel sounds.  EXTREMITIES:  No clubbing, cyanosis or edema bilaterally.  SKIN:  No rashes or petechiae.  NEURO:  Cranial nerves grossly intact. No focal deficits.  PSYCH:  Alert and oriented x3.    The physical exam is unchanged from the one earlier this year.    LABORATORY  Lab Results   Component Value Date    WBC 4.97 10/23/2023    HGB 14.5 10/23/2023    HCT 43.3 10/23/2023    MCV 93.5 10/23/2023     10/23/2023    NEUTROABS 2.38 10/23/2023       Lab Results   Component Value Date     10/23/2023    K 4.5 10/23/2023     10/23/2023    CO2 28.2 10/23/2023    BUN 7 10/23/2023    CREATININE 0.78 10/23/2023    GLUCOSE 108 (H) 10/23/2023    CALCIUM 9.3 10/23/2023    AST 19 10/23/2023    ALT 17 10/23/2023    ALKPHOS 96 10/23/2023    BILITOT 0.3 10/23/2023    PROTEINTOT 7.0 10/23/2023    ALBUMIN 4.4 10/23/2023     Homocysteine level, serum (10/23/2023): 12.5 umol/L  Homocysteine level, serum (2023): 64.3 " umol/L  Homocysteine level, serum (07/20/2022): 24.5 umol/L  Homocysteine level, serum (05/20/2022): 80.2 umol/L  Homocysteine level, serum (11/30/2021): 18.3 umol/L  Homocysteine level, serum (08/26/2021): 12.4 umol/L  Homocysteine level, serum (05/27/2021): 82.3 umol/L  Homocysteine level, serum (06/06/2019): 29.4 umol/L  Homocysteine level, serum (05/11/2017): 24.5 umol/L  Homocysteine level, serum (05/13/2016): 53.2 umol/L    Extensive hypercoagulable workup on 11/15/2013 was negative (full results are available in our portal system) with the following exceptions:        Homocystine level, serum: > 65.0  UMOL/L (upper limit of normal: 13.9)   MTHFR, DNA analysis: Two copies of the same mutation (C677T and C677T) identified.   Factor II, DNA analysis: Single [id number] mutation identified       IMAGING    PATHOLOGY    IMPRESSION AND PLAN  Ms. Oconnor is a 52 y.o., white female with:  Hyperhomocysteinemia/hypercoagulable state: A significantly increased level (> 65 UMOL/L) was confirmed at the time of her hypercoagulable workup in 2013, and the results of the MTHFR DNA analysis provides an explanation for this. She has two copies of the C677T mutation, which has been shown to be associated with elevated homocysteine levels. Most importantly, this elevated homocysteine level is probably playing a major role in her (and, almost certainly, her family's) early-onset heart disease. Though the process is not fully understood, it is believed that elevated homocysteine levels contribute to chronic arterial wall inflammation. This exacerbates the effect of cholesterol, hypertension and other commonly associated risk factors for CAD, ultimately leading to premature plaque buildups and, consequently, earlier onset, premature heart disease and heart attacks. 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 While this is the second most common cause of an inherited thrombophilia, her risk of  venous clotting (such as DVTs or PEs) appears to be a relatively minor issue (although at her initial consultation  with us she reported a history of a “lung clot”, a review of her records from April 2012 showed that her cardiac event appeared to be her only diagnosis). This explained why she had never been on Coumadin (but rather, just ASA and Plavix) before she was started on it in October 2013 for her homocysteine levels. All of this being said, her factor II gene mutation is certainly not helping her coronary artery (or venous) thrombotic risk. I have had multiple, long discussions with the patient regarding these findings and issues (including, again, today). There is, unfortunately, no known way to completely or permanently correct her hyperhomocysteinemia (just as there is no “cure” for the presence of the mutated genes that are  leading to it). A repeat serum homocysteine level performed on 05/27/2021 had significantly reincreased (to ~82 umol/L). She was started on a different Vitamin B/folate supplementation at that time (daily Folbee), and she has been consistently taking it since then. With this adjustment, the repeat serum homocysteine level on 07/20/2022) remained much improved (24.5 umol/L); however the repeat level drawn on 07/26/2023 was again significantly elevated (64.3 umol/L) (07/26/2023). Consequently, she was instructed to start taking the Folbee twice, rather than only once, per day; and, after she has done this for the past couple of months, the most recent repeat homocysteine level (drawn yesterday, 10/23/2023) is now much improved and WNL again (12.5 umol/L). Even though decreasing her homocysteine level by as much as possible helps some (by hopefully reducing the associated vessel inflammation), continuing to address her more readily controllable risk factors (ongoing abstinence from smoking, statin dosing, etc.) and continuing ASA, Plavix and full anticoagulation (she continues to  tolerate Coumadin overall well) as she has been doing, all remain critical in mitigating her high risk of cardiovascular events as much as possible. She will continue to follow closely with her cardiologist and continue to take her Folbee twice per day. We will see her back in our clinic in one year (October 2024) with repeat labs.  Genetic risk: The patient' s daughter all but certainly inherited one of the MTHFR C677T mutations (which might increase her longer term cardiac risk just as it did her mother's) and has a 50% chance of having her mother's copy of the prothrombin [id number] mutation (which, as mentioned, is the second most common cause of an inherited, mild thrombophilia). Particularly since her daughter is still in her mid-twenties, neither of these are likely reason enough to place her (the daughter) on antiplatelet drugs or full anticoagulation immediately, but they continue to be something of which they (the patient and her daughter) need to remain aware. The patient's daughter has undergone both genetic counseling and hematology evaluations.  Nausea: A chronic issue related to her general intolerance of the smell of most foods. Phenergan is reportedly the only medicine that helps her overcome this symptom enough to be able to eat. Refills were provided today.  The patient was in agreement with these plans.    It is a pleasure to participate in Ms. Oconnor's care. Please do not hesitate to call with any questions or concerns that you may have.    A total of 30 minutes were spent coordinating this patient’s care in clinic today; more than 50% of this time was face-to-face with the patient, reviewing her interim medical history, discussing the results of yesterday's repeat labwork and counseling on the current treatment and followup plan. All questions were answered to her satisfaction.    FOLLOW UP  Continue prn Phenergan (refills provided today). Continue ASA, Plavix, Coumadin and BID Folbee. With  cardiology and her PCP as previously planned. Return to our clinic in 1 year (October 2024) with a CBC, CMP and repeat homocysteine level.            This document was electronically signed by YURI Perkins MD October 24, 2023 13:54 EDT      CC: MD Reinier Cantu IV, MD

## 2023-11-13 RX ORDER — PROMETHAZINE HYDROCHLORIDE 25 MG/1
25 TABLET ORAL EVERY 8 HOURS PRN
Qty: 90 TABLET | Refills: 11 | Status: SHIPPED | OUTPATIENT
Start: 2023-11-13

## 2023-11-13 NOTE — TELEPHONE ENCOUNTER
Caller: Jania Oconnor    Relationship: Self    Best call back number: [phone]    Requested Prescriptions:   Requested Prescriptions     Pending Prescriptions Disp Refills    promethazine (PHENERGAN) 25 MG tablet 90 tablet 11     Sig: Take 1 tablet by mouth Every 8 (Eight) Hours As Needed for Nausea or Vomiting.        Pharmacy where request should be sent: [facility] - [place], [place] - [address] - [phone]  - [phone]     Last office visit with prescribing clinician: 10/24/2023   Last telemedicine visit with prescribing clinician: Visit date not found   Next office visit with prescribing clinician: Visit date not found     Additional details provided by patient:     Does the patient have less than a 3 day supply:  [x] Yes  [] No    Would you like a call back once the refill request has been completed: [x] Yes [] No    If the office needs to give you a call back, can they leave a voicemail: [x] Yes [] No

## 2024-09-20 ENCOUNTER — TELEPHONE (OUTPATIENT)
Dept: ONCOLOGY | Facility: CLINIC | Age: 54
End: 2024-09-20
Payer: MEDICARE

## 2024-09-30 NOTE — TELEPHONE ENCOUNTER
Caller: Jania Oconnor    Relationship to patient: Self    Best call back number: [phone]    PT CAN BE REACHED ANYTIME; OKAY TO LEAVE MESSAGE IF NO ANSWER    Patient is needing: PT IS INQUIRING ABOUT TEST RESULTS FROM TESTS DONE ON: 2/21        
100

## 2024-10-07 ENCOUNTER — CLINICAL SUPPORT (OUTPATIENT)
Dept: ONCOLOGY | Facility: CLINIC | Age: 54
End: 2024-10-07
Payer: MEDICARE

## 2024-10-07 VITALS
TEMPERATURE: 98.2 F | SYSTOLIC BLOOD PRESSURE: 99 MMHG | HEART RATE: 114 BPM | DIASTOLIC BLOOD PRESSURE: 66 MMHG | RESPIRATION RATE: 18 BRPM | OXYGEN SATURATION: 97 %

## 2024-10-07 DIAGNOSIS — I25.110 CORONARY ARTERY DISEASE INVOLVING NATIVE CORONARY ARTERY OF NATIVE HEART WITH UNSTABLE ANGINA PECTORIS: ICD-10-CM

## 2024-10-07 DIAGNOSIS — I70.91 GENERALIZED ATHEROSCLEROSIS: ICD-10-CM

## 2024-10-07 DIAGNOSIS — E72.11 HYPERHOMOCYSTEINEMIA: ICD-10-CM

## 2024-10-07 DIAGNOSIS — Z79.899 LONG-TERM USE OF HIGH-RISK MEDICATION: ICD-10-CM

## 2024-10-07 DIAGNOSIS — D68.51 FACTOR 5 LEIDEN MUTATION, HETEROZYGOUS: ICD-10-CM

## 2024-10-07 LAB
ALBUMIN SERPL-MCNC: 3.9 G/DL (ref 3.5–5.2)
ALBUMIN/GLOB SERPL: 1.3 G/DL
ALP SERPL-CCNC: 90 U/L (ref 39–117)
ALT SERPL W P-5'-P-CCNC: 14 U/L (ref 1–33)
ANION GAP SERPL CALCULATED.3IONS-SCNC: 12.9 MMOL/L (ref 5–15)
AST SERPL-CCNC: 19 U/L (ref 1–32)
BASOPHILS # BLD AUTO: 0.06 10*3/MM3 (ref 0–0.2)
BASOPHILS NFR BLD AUTO: 1 % (ref 0–1.5)
BILIRUB SERPL-MCNC: 0.3 MG/DL (ref 0–1.2)
BUN SERPL-MCNC: 7 MG/DL (ref 6–20)
BUN/CREAT SERPL: 10.1 (ref 7–25)
CALCIUM SPEC-SCNC: 9.3 MG/DL (ref 8.6–10.5)
CHLORIDE SERPL-SCNC: 102 MMOL/L (ref 98–107)
CO2 SERPL-SCNC: 22.1 MMOL/L (ref 22–29)
CREAT SERPL-MCNC: 0.69 MG/DL (ref 0.57–1)
DEPRECATED RDW RBC AUTO: 45.5 FL (ref 37–54)
EGFRCR SERPLBLD CKD-EPI 2021: 103.9 ML/MIN/1.73
EOSINOPHIL # BLD AUTO: 0.28 10*3/MM3 (ref 0–0.4)
EOSINOPHIL NFR BLD AUTO: 4.5 % (ref 0.3–6.2)
ERYTHROCYTE [DISTWIDTH] IN BLOOD BY AUTOMATED COUNT: 13.3 % (ref 12.3–15.4)
GLOBULIN UR ELPH-MCNC: 3.1 GM/DL
GLUCOSE SERPL-MCNC: 132 MG/DL (ref 65–99)
HCT VFR BLD AUTO: 42.2 % (ref 34–46.6)
HGB BLD-MCNC: 13.9 G/DL (ref 12–15.9)
IMM GRANULOCYTES # BLD AUTO: 0.02 10*3/MM3 (ref 0–0.05)
IMM GRANULOCYTES NFR BLD AUTO: 0.3 % (ref 0–0.5)
LYMPHOCYTES # BLD AUTO: 1.8 10*3/MM3 (ref 0.7–3.1)
LYMPHOCYTES NFR BLD AUTO: 28.7 % (ref 19.6–45.3)
MCH RBC QN AUTO: 30.8 PG (ref 26.6–33)
MCHC RBC AUTO-ENTMCNC: 32.9 G/DL (ref 31.5–35.7)
MCV RBC AUTO: 93.6 FL (ref 79–97)
MONOCYTES # BLD AUTO: 0.29 10*3/MM3 (ref 0.1–0.9)
MONOCYTES NFR BLD AUTO: 4.6 % (ref 5–12)
NEUTROPHILS NFR BLD AUTO: 3.83 10*3/MM3 (ref 1.7–7)
NEUTROPHILS NFR BLD AUTO: 60.9 % (ref 42.7–76)
NRBC BLD AUTO-RTO: 0 /100 WBC (ref 0–0.2)
PLATELET # BLD AUTO: 246 10*3/MM3 (ref 140–450)
PMV BLD AUTO: 11 FL (ref 6–12)
POTASSIUM SERPL-SCNC: 4 MMOL/L (ref 3.5–5.2)
PROT SERPL-MCNC: 7 G/DL (ref 6–8.5)
RBC # BLD AUTO: 4.51 10*6/MM3 (ref 3.77–5.28)
SODIUM SERPL-SCNC: 137 MMOL/L (ref 136–145)
WBC NRBC COR # BLD AUTO: 6.28 10*3/MM3 (ref 3.4–10.8)

## 2024-10-07 PROCEDURE — 36415 COLL VENOUS BLD VENIPUNCTURE: CPT | Performed by: INTERNAL MEDICINE

## 2024-10-07 PROCEDURE — 80053 COMPREHEN METABOLIC PANEL: CPT | Performed by: INTERNAL MEDICINE

## 2024-10-07 PROCEDURE — 83090 ASSAY OF HOMOCYSTEINE: CPT | Performed by: INTERNAL MEDICINE

## 2024-10-07 PROCEDURE — 85025 COMPLETE CBC W/AUTO DIFF WBC: CPT | Performed by: INTERNAL MEDICINE

## 2024-10-07 NOTE — PROGRESS NOTES
Venipuncture Blood Specimen Collection  Venipuncture performed in right ARM by Bia Patel MA with good hemostasis. Patient tolerated the procedure well without complications.   10/07/24   Bia Patel MA

## 2024-10-08 ENCOUNTER — LAB (OUTPATIENT)
Dept: ONCOLOGY | Facility: CLINIC | Age: 54
End: 2024-10-08
Payer: MEDICARE

## 2024-10-08 ENCOUNTER — OFFICE VISIT (OUTPATIENT)
Dept: ONCOLOGY | Facility: CLINIC | Age: 54
End: 2024-10-08
Payer: MEDICARE

## 2024-10-08 VITALS
BODY MASS INDEX: 21.02 KG/M2 | SYSTOLIC BLOOD PRESSURE: 129 MMHG | RESPIRATION RATE: 18 BRPM | HEART RATE: 106 BPM | WEIGHT: 118.6 LBS | HEIGHT: 63 IN | DIASTOLIC BLOOD PRESSURE: 81 MMHG | OXYGEN SATURATION: 98 % | TEMPERATURE: 97.7 F

## 2024-10-08 DIAGNOSIS — D68.51 FACTOR 5 LEIDEN MUTATION, HETEROZYGOUS: ICD-10-CM

## 2024-10-08 DIAGNOSIS — E72.11 HYPERHOMOCYSTEINEMIA: ICD-10-CM

## 2024-10-08 DIAGNOSIS — E72.11 HYPERHOMOCYSTEINEMIA: Primary | ICD-10-CM

## 2024-10-08 DIAGNOSIS — I70.90 UNSPECIFIED ATHEROSCLEROSIS: ICD-10-CM

## 2024-10-08 LAB — HCYS SERPL-MCNC: 40.1 UMOL/L (ref 0–15)

## 2024-10-08 PROCEDURE — 3074F SYST BP LT 130 MM HG: CPT | Performed by: INTERNAL MEDICINE

## 2024-10-08 PROCEDURE — 82607 VITAMIN B-12: CPT | Performed by: INTERNAL MEDICINE

## 2024-10-08 PROCEDURE — 1126F AMNT PAIN NOTED NONE PRSNT: CPT | Performed by: INTERNAL MEDICINE

## 2024-10-08 PROCEDURE — 82746 ASSAY OF FOLIC ACID SERUM: CPT | Performed by: INTERNAL MEDICINE

## 2024-10-08 PROCEDURE — 36415 COLL VENOUS BLD VENIPUNCTURE: CPT | Performed by: INTERNAL MEDICINE

## 2024-10-08 PROCEDURE — 3079F DIAST BP 80-89 MM HG: CPT | Performed by: INTERNAL MEDICINE

## 2024-10-08 PROCEDURE — 99214 OFFICE O/P EST MOD 30 MIN: CPT | Performed by: INTERNAL MEDICINE

## 2024-10-08 RX ORDER — PROMETHAZINE HYDROCHLORIDE 25 MG/1
25 TABLET ORAL EVERY 8 HOURS PRN
Qty: 90 TABLET | Refills: 11 | Status: SHIPPED | OUTPATIENT
Start: 2024-10-08

## 2024-10-08 RX ORDER — CYANOCOBALAMIN/FOLIC AC/VIT B6 1-2.5-25MG
1 TABLET ORAL 2 TIMES DAILY
Qty: 60 TABLET | Refills: 11 | Status: SHIPPED | OUTPATIENT
Start: 2024-10-08

## 2024-10-08 NOTE — PROGRESS NOTES
Venipuncture Blood Specimen Collection  Venipuncture performed in left arm by Bhumika Diego MA with good hemostasis. Patient tolerated the procedure well without complications.   10/08/24   Bhumika Diego MA

## 2024-10-08 NOTE — PROGRESS NOTES
Name:  Jania Oconnor  :  1970  Date:  10/8/2024     PRIMARY CARE PHYSICIAN  Rissa Mason MD    REASON FOR FOLLOWUP  1. Hyperhomocysteinemia      CHIEF COMPLAINT  None.    Dear Dr. Mason,    HISTORY OF PRESENT ILLNESS:   I saw Ms. Oconnor in follow up today in our hematology clinic. As you are aware, she is a pleasant, 53 y.o., white female with an extensive family history of heart disease and early-age heart attacks (and resulting deaths) who, in 2012, presented to the [facility] ER with a severe episode of chest pain. She was found to have an acute, anterior, ST-elevation MI. She was med-flighted to [facility], where a cardiac catheterization was performed and she was found to have 95% blockage in her LAD. Stents were placed, and she was discharged on ASA and Plavix. She quit smoking at that time. She has done fairly well from this standpoint since; however, by 2013, she was noted to have a significantly elevated (and rising) homocysteine level. Her previous PCP ordered a hypercoagulable workup, started her on Coumadin and referred her to our clinic for further evaluation. The patient was initially evaluated by Dr. Castaneda on 11/15/2013, and a complete hypercoagulable panel was ordered.  I saw her for the first time in follow up in 2013. Between those visits, recommendations were made to continue lifelong anticoagulation (she has been doing well on Coumadin), B12 and folic acid (the latter two for her hereditary hyperhomocysteinemia, which likely played a large role in her early-age MI) in addition to ASA and Plavix. She was also referred to our genetic counselor.     INTERIM HISTORY:  Ms. Oconnor returns to clinic today for follow up by herself. She remains on Coumadin and is still tolerating it well (for the most part, she checks her levels and adjusts her daily dosage on her own). She may have potentially suffered a mild heart attack in early Summer 2021; but,  "otherwise, she has still not had any significant cardiac issues since her initial STEMI in . In May 2021, she had to receive treatment for Lyme disease (she woke up one morning with ticks \"covering\" her left leg). The headache and thrush she was experiencing on this treatment remain resolved. Phenergan continues to be the only medicine that helps her eat (as the smell of food has chronically made her nauseous; she again requests refills at this time). She continues to take Folbee (a combination B-vitamin and folic acid supplement) twice per day. Her daughter recently moved from [place] to [place], just in time to be affected by Hurricane [place]; so her (the patient's) stress levels have been \"pretty high\" in recent weeks. She otherwise again has no new or other specific complaints and continues to feel overall well.    Past Medical History:   Diagnosis Date    Anemia     Anxiety     Arthritis     CHF (congestive heart failure)     Hyperlipidemia     Lyme disease     Metabolic disorder 2016    Myocardial infarction     Panic disorder        Past Surgical History:   Procedure Laterality Date    CARDIAC CATHETERIZATION  2012    DILATATION AND CURETTAGE      HYSTERECTOMY  2004    benign    SINUS SURGERY      VASCULAR SURGERY         Social History     Socioeconomic History    Marital status:    Tobacco Use    Smoking status: Former     Current packs/day: 0.00     Average packs/day: 0.5 packs/day for 15.0 years (7.5 ttl pk-yrs)     Types: Cigarettes     Start date: 4/3/1997     Quit date: 4/3/2012     Years since quittin.5    Smokeless tobacco: Never   Vaping Use    Vaping status: Former   Substance and Sexual Activity    Alcohol use: No    Drug use: No    Sexual activity: Defer       Family History   Problem Relation Age of Onset    Lung cancer Mother     Breast cancer Mother 47    Heart attack Father 45        several MI's    Heart failure Father     Heart attack Sister 42    Heart " attack Brother 36    Heart attack Paternal Uncle 40    Heart attack Paternal Uncle 45    Heart attack Paternal Uncle 45    Heart attack Paternal Uncle 40    Heart attack Paternal Uncle 45    Heart attack Paternal Uncle 45       Allergies   Allergen Reactions    Atorvastatin GI Intolerance    Erythromycin GI Intolerance       Current Outpatient Medications   Medication Sig Dispense Refill    ALPRAZolam (XANAX) 1 MG tablet Take 1 tablet by mouth 3 (Three) Times a Day As Needed for Anxiety.      amLODIPine (NORVASC) 2.5 MG tablet Take 1 tablet by mouth Daily. 90 tablet 1    aspirin 81 MG EC tablet Take 1 tablet by mouth Daily.      clopidogrel (PLAVIX) 75 MG tablet Take 1 tablet by mouth Daily. 90 tablet 3    folic acid-vit B6-vit B12 (Folbee) 2.5-25-1 MG tablet tablet Take 1 tablet by mouth 2 (Two) Times a Day. 60 tablet 11    furosemide (LASIX) 40 MG tablet Take 1 tablet by mouth Daily.      lisinopril (PRINIVIL,ZESTRIL) 10 MG tablet Take 1 tablet by mouth Daily.      nitroglycerin (NITROSTAT) 0.4 MG SL tablet Place 1 tablet under the tongue Every 5 (Five) Minutes As Needed for Chest Pain. Take no more than 3 doses in 15 minutes. 25 tablet 5    promethazine (PHENERGAN) 25 MG tablet Take 1 tablet by mouth Every 8 (Eight) Hours As Needed for Nausea or Vomiting. 90 tablet 11    rosuvastatin (CRESTOR) 40 MG tablet Take 1 tablet by mouth Daily. 90 tablet 3    warfarin (COUMADIN) 10 MG tablet Alternating between 8-10mg      metoprolol succinate XL (TOPROL-XL) 25 MG 24 hr tablet Take 1 tablet by mouth Daily. (Patient not taking: Reported on 10/8/2024) 90 tablet 3    nystatin (MYCOSTATIN) 442860 UNIT/ML suspension Swish and swallow 5 mL 4 (Four) Times a Day. (Patient not taking: Reported on 10/8/2024) 473 mL 1     No current facility-administered medications for this visit.     REVIEW OF SYSTEMS  CONSTITUTIONAL:  No fever, chills, night sweats or fatigue.  EYES:  No blurry vision, diplopia or other vision changes.  ENT:  No  "hearing loss or nosebleeds.  CARDIOVASCULAR:  No palpitations or edema. Intermittent dizzy episodes and bradycardia, as per the HPI above.  PULMONARY:  No hemoptysis, wheezing, chronic cough or shortness of breath.  GASTROINTESTINAL:  No constipation or diarrhea. No abdominal pain. Chronic nausea and decreased appetite, as per the HPI above.  GENITOURINARY:  No hematuria, kidney stones or frequent urination.  MUSCULOSKELETAL:  No joint or back pains.  INTEGUMENTARY: No rashes or pruritus.  ENDOCRINE:  No excessive thirst or hot flashes.  HEMATOLOGIC:  No history of free bleeding, spontaneous bleeding or clotting.  IMMUNOLOGIC:  No allergies or frequent infections.  NEUROLOGIC: No numbness, tingling, seizures or weakness.  PSYCHIATRIC:  No anxiety or depression.    PHYSICAL EXAMINATION  /81   Pulse 106   Temp 97.7 °F (36.5 °C) (Temporal)   Resp 18   Ht 160 cm (63\")   Wt 53.8 kg (118 lb 9.6 oz)   SpO2 98%   BMI 21.01 kg/m²     ECO  GENERAL:  A well-developed, well-nourished, thin, white female in no acute distress.  HEENT:  Pupils equally round and reactive to light. Extraocular muscles intact.  CARDIOVASCULAR:  Regular rate and rhythm. No murmurs, gallops or rubs.  LUNGS:  Clear to auscultation bilaterally.  ABDOMEN:  Soft, nontender, nondistended with positive bowel sounds.  EXTREMITIES:  No clubbing, cyanosis or edema bilaterally.  SKIN:  No rashes or petechiae.  NEURO:  Cranial nerves grossly intact. No focal deficits.  PSYCH:  Alert and oriented x3.    The physical exam is unchanged from the one last year.    LABORATORY  Lab Results   Component Value Date    WBC 6.28 10/07/2024    HGB 13.9 10/07/2024    HCT 42.2 10/07/2024    MCV 93.6 10/07/2024     10/07/2024    NEUTROABS 3.83 10/07/2024       Lab Results   Component Value Date     10/07/2024    K 4.0 10/07/2024     10/07/2024    CO2 22.1 10/07/2024    BUN 7 10/07/2024    CREATININE 0.69 10/07/2024    GLUCOSE 132 (H) " 10/07/2024    CALCIUM 9.3 10/07/2024    AST 19 10/07/2024    ALT 14 10/07/2024    ALKPHOS 90 10/07/2024    BILITOT 0.3 10/07/2024    PROTEINTOT 7.0 10/07/2024    ALBUMIN 3.9 10/07/2024     Homocysteine level, serum (10/07/2024): 40.1 umol/L  Homocysteine level, serum (10/23/2023): 12.5 umol/L  Homocysteine level, serum (07/26/2023): 64.3 umol/L  Homocysteine level, serum (07/20/2022): 24.5 umol/L  Homocysteine level, serum (05/20/2022): 80.2 umol/L  Homocysteine level, serum (11/30/2021): 18.3 umol/L  Homocysteine level, serum (08/26/2021): 12.4 umol/L  Homocysteine level, serum (05/27/2021): 82.3 umol/L  Homocysteine level, serum (06/06/2019): 29.4 umol/L  Homocysteine level, serum (05/11/2017): 24.5 umol/L  Homocysteine level, serum (05/13/2016): 53.2 umol/L    Extensive hypercoagulable workup on 11/15/2013 was negative (full results are available in our portal system) with the following exceptions:        Homocystine level, serum: > 65.0  UMOL/L (upper limit of normal: 13.9)   MTHFR, DNA analysis: Two copies of the same mutation (C677T and C677T) identified.   Factor II, DNA analysis: Single [id number] mutation identified       IMAGING    PATHOLOGY    IMPRESSION AND PLAN  Ms. Oconnor is a 53 y.o., white female with:  Hyperhomocysteinemia/hypercoagulable state: A significantly increased level (> 65 UMOL/L) was confirmed at the time of her hypercoagulable workup in 2013, and the results of the MTHFR DNA analysis provides an explanation for this. She has two copies of the C677T mutation, which has been shown to be associated with elevated homocysteine levels. Most importantly, this elevated homocysteine level is probably playing a major role in her (and, almost certainly, her family's) early-onset heart disease. Though the process is not fully understood, it is believed that elevated homocysteine levels contribute to chronic arterial wall inflammation. This exacerbates the effect of cholesterol, hypertension and other  commonly associated risk factors for CAD, ultimately leading to premature plaque buildups and, consequently, earlier onset, premature heart disease and heart attacks. 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 While this is the second most common cause of an inherited thrombophilia, her risk of venous clotting (such as DVTs or PEs) appears to be a relatively minor issue (although at her initial consultation  with us she reported a history of a “lung clot”, a review of her records from April 2012 showed that her cardiac event appeared to be her only diagnosis). This explained why she had never been on Coumadin (but rather, just ASA and Plavix) before she was started on it in October 2013 for her homocysteine levels. All of this being said, her factor II gene mutation is certainly not helping her coronary artery (or venous) thrombotic risk. I have had multiple, long discussions with the patient regarding these findings and issues (including, again, today). There is, unfortunately, no known way to completely or permanently correct her hyperhomocysteinemia (just as there is no “cure” for the presence of the mutated genes that are  leading to it). A repeat serum homocysteine level performed on 05/27/2021 had significantly reincreased (to ~82 umol/L). She was started on a different Vitamin B/folate supplementation at that time (daily Folbee), and she has been consistently taking it since then. With this adjustment, the repeat serum homocysteine level on 07/20/2022) remained much improved (24.5 umol/L); however the repeat level drawn on 07/26/2023 was again significantly elevated (64.3 umol/L) (07/26/2023). Consequently, she was instructed to start taking the Folbee twice, rather than only once, per day; and, after she did this for a couple of months, the repeat level drawn on 10/23/2023 was reimproved and back to within normal range (12.5 umol/L). Yesterday's (10/07/2024) repeat  level is now elevated again (~40 umol/L); but, she explains, she has recently been taking a different supplement that her daughter gave her. She was instructed to switch back to the BID Folbee (and refills were provided today). Even though decreasing her homocysteine level by as much as possible helps some (by hopefully reducing the associated vessel inflammation), continuing to address her more readily controllable risk factors (ongoing abstinence from smoking, statin dosing, etc.) and continuing ASA, Plavix and full anticoagulation (she continues to tolerate Coumadin overall well) as she has been doing, all remain critical in mitigating her high risk of cardiovascular events as much as possible. She will continue to follow closely with her cardiologist. We will see her back in our clinic in one year (October 2025) with repeat labs.  Genetic risk: The patient' s daughter all but certainly inherited one of the MTHFR C677T mutations (which might increase her longer term cardiac risk just as it did her mother's) and has a 50% chance of having her mother's copy of the prothrombin [id number] mutation (which, as mentioned, is the second most common cause of an inherited, mild thrombophilia). Particularly since her daughter is still in her mid-twenties, neither of these are likely reason enough to place her (the daughter) on antiplatelet drugs or full anticoagulation immediately, but they continue to be something of which they (the patient and her daughter) need to remain aware. The patient's daughter has undergone both genetic counseling and hematology evaluations.  Nausea: A chronic issue related to her general intolerance of the smell of most foods. Phenergan is reportedly still the only medicine that helps her overcome this symptom enough to be able to eat. Refills were again provided.  The patient was in agreement with these plans.    It is a pleasure to participate in Ms. Oconnor's care. Please do not hesitate to call  with any questions or concerns that you may have.    A total of 30 minutes were spent coordinating this patient’s care in clinic today; more than 50% of this time was face-to-face with the patient, reviewing her interim medical history, discussing the results of yesterday's repeat labwork and counseling on the ongoing treatment and followup plan. All questions were answered to her satisfaction.    FOLLOW UP  Continue prn Phenergan (refills provided today). Continue ASA, Plavix, Coumadin and BID Folbee (refills of the latter also provided today). With cardiology and her PCP as previously planned. Return to our clinic in 1 year (October 2025) with a CBC, B12, serum folate and repeat homocysteine level.            This document was electronically signed by YURI Perkins MD October 8, 2024 11:23 EDT      CC: MD Reinier Cantu IV, MD

## 2024-10-09 LAB
FOLATE SERPL-MCNC: 13.2 NG/ML (ref 4.78–24.2)
VIT B12 BLD-MCNC: >2000 PG/ML (ref 211–946)

## 2025-02-18 ENCOUNTER — TRANSCRIBE ORDERS (OUTPATIENT)
Dept: ADMINISTRATIVE | Facility: HOSPITAL | Age: 55
End: 2025-02-18
Payer: MEDICARE

## 2025-02-18 DIAGNOSIS — Z12.31 VISIT FOR SCREENING MAMMOGRAM: Primary | ICD-10-CM

## 2025-05-22 ENCOUNTER — HOSPITAL ENCOUNTER (OUTPATIENT)
Dept: MAMMOGRAPHY | Facility: HOSPITAL | Age: 55
Discharge: HOME | End: 2025-05-22
Admitting: FAMILY MEDICINE
Payer: MEDICARE

## 2025-05-22 DIAGNOSIS — Z12.31 VISIT FOR SCREENING MAMMOGRAM: ICD-10-CM

## 2025-05-22 PROCEDURE — 77067 SCR MAMMO BI INCL CAD: CPT

## 2025-05-22 PROCEDURE — 77063 BREAST TOMOSYNTHESIS BI: CPT
